# Patient Record
Sex: FEMALE | Race: WHITE | NOT HISPANIC OR LATINO | Employment: OTHER | URBAN - METROPOLITAN AREA
[De-identification: names, ages, dates, MRNs, and addresses within clinical notes are randomized per-mention and may not be internally consistent; named-entity substitution may affect disease eponyms.]

---

## 2017-01-13 RX ORDER — CLOBETASOL PROPIONATE 0.5 MG/G
OINTMENT TOPICAL
Qty: 45 G | Refills: 0 | Status: SHIPPED | OUTPATIENT
Start: 2017-01-13 | End: 2017-04-20 | Stop reason: SDUPTHER

## 2017-02-06 RX ORDER — ACYCLOVIR 400 MG/1
TABLET ORAL
Qty: 30 TABLET | Refills: 0 | Status: SHIPPED | OUTPATIENT
Start: 2017-02-06 | End: 2017-04-12 | Stop reason: SDUPTHER

## 2017-02-06 RX ORDER — ACYCLOVIR 400 MG/1
TABLET ORAL
Qty: 30 TABLET | Refills: 0 | Status: SHIPPED | OUTPATIENT
Start: 2017-02-06 | End: 2017-06-27 | Stop reason: SDUPTHER

## 2017-04-12 RX ORDER — ACYCLOVIR 400 MG/1
TABLET ORAL
Qty: 30 TABLET | Refills: 0 | Status: SHIPPED | OUTPATIENT
Start: 2017-04-12 | End: 2017-06-03 | Stop reason: SDUPTHER

## 2017-04-20 RX ORDER — CLOBETASOL PROPIONATE 0.5 MG/G
OINTMENT TOPICAL
Qty: 1 EACH | Refills: 1 | Status: SHIPPED | OUTPATIENT
Start: 2017-04-20 | End: 2017-06-27 | Stop reason: SDUPTHER

## 2017-06-05 RX ORDER — ACYCLOVIR 400 MG/1
TABLET ORAL
Qty: 30 TABLET | Refills: 0 | Status: SHIPPED | OUTPATIENT
Start: 2017-06-05 | End: 2017-07-31

## 2017-06-27 RX ORDER — ACYCLOVIR 400 MG/1
400 TABLET ORAL
Qty: 30 TABLET | Refills: 2 | Status: SHIPPED | OUTPATIENT
Start: 2017-06-27 | End: 2017-10-02 | Stop reason: SDUPTHER

## 2017-06-27 RX ORDER — CLOBETASOL PROPIONATE 0.5 MG/G
OINTMENT TOPICAL 2 TIMES WEEKLY
Qty: 1 EACH | Refills: 1 | Status: SHIPPED | OUTPATIENT
Start: 2017-06-29 | End: 2017-07-12 | Stop reason: SDUPTHER

## 2017-07-07 ENCOUNTER — TELEPHONE (OUTPATIENT)
Dept: OBSTETRICS AND GYNECOLOGY | Facility: CLINIC | Age: 72
End: 2017-07-07

## 2017-07-07 NOTE — TELEPHONE ENCOUNTER
Pt called in she stated the clobetasol (TEMOVATE) 0.05 % ointment is not enough. She stated that she needed a larger tube or rx or a different alternative.     Thank you-      Yeimi     Tell her a prescription has been called in

## 2017-07-10 ENCOUNTER — TELEPHONE (OUTPATIENT)
Dept: OBSTETRICS AND GYNECOLOGY | Facility: CLINIC | Age: 72
End: 2017-07-10

## 2017-07-12 RX ORDER — CLOBETASOL PROPIONATE 0.5 MG/G
OINTMENT TOPICAL 2 TIMES WEEKLY
Qty: 60 G | Refills: 1 | Status: SHIPPED | OUTPATIENT
Start: 2017-07-13 | End: 2017-11-07 | Stop reason: SDUPTHER

## 2017-07-24 RX ORDER — PSEUDOEPHEDRINE HCL AND TRIPROLIDINE HCL 60; 2.5 MG/1; MG/1
TABLET, FILM COATED ORAL
Refills: 2 | COMMUNITY
Start: 2017-05-29 | End: 2019-04-22 | Stop reason: SDUPTHER

## 2017-07-31 ENCOUNTER — APPOINTMENT (OUTPATIENT)
Dept: WOMENS IMAGING | Facility: HOSPITAL | Age: 72
End: 2017-07-31

## 2017-07-31 ENCOUNTER — PROCEDURE VISIT (OUTPATIENT)
Dept: OBSTETRICS AND GYNECOLOGY | Facility: CLINIC | Age: 72
End: 2017-07-31

## 2017-07-31 DIAGNOSIS — Z12.31 VISIT FOR SCREENING MAMMOGRAM: Primary | ICD-10-CM

## 2017-07-31 PROCEDURE — G0202 SCR MAMMO BI INCL CAD: HCPCS | Performed by: RADIOLOGY

## 2017-07-31 PROCEDURE — G0202 SCR MAMMO BI INCL CAD: HCPCS | Performed by: OBSTETRICS & GYNECOLOGY

## 2017-08-03 ENCOUNTER — TELEPHONE (OUTPATIENT)
Dept: OBSTETRICS AND GYNECOLOGY | Facility: CLINIC | Age: 72
End: 2017-08-03

## 2017-08-03 NOTE — TELEPHONE ENCOUNTER
----- Message from Arnaud Johnson MD sent at 8/3/2017  9:17 AM EDT -----  Tell the patient her mammogram was normal  ----- Message -----     From: Interface, Scans Incoming     Sent: 8/3/2017   8:15 AM       To: Arnaud Johnson MD

## 2017-10-02 RX ORDER — ACYCLOVIR 400 MG/1
TABLET ORAL
Qty: 30 TABLET | Refills: 0 | Status: SHIPPED | OUTPATIENT
Start: 2017-10-02 | End: 2017-10-17 | Stop reason: SDUPTHER

## 2017-10-18 RX ORDER — ACYCLOVIR 400 MG/1
TABLET ORAL
Qty: 30 TABLET | Refills: 0 | Status: SHIPPED | OUTPATIENT
Start: 2017-10-18 | End: 2017-11-07 | Stop reason: SDUPTHER

## 2017-11-08 RX ORDER — CLOBETASOL PROPIONATE 0.5 MG/G
OINTMENT TOPICAL
Qty: 1 EACH | Refills: 1 | Status: SHIPPED | OUTPATIENT
Start: 2017-11-08 | End: 2017-12-12 | Stop reason: SDUPTHER

## 2017-11-08 RX ORDER — ACYCLOVIR 400 MG/1
TABLET ORAL
Qty: 30 TABLET | Refills: 0 | Status: SHIPPED | OUTPATIENT
Start: 2017-11-08 | End: 2017-12-12 | Stop reason: SDUPTHER

## 2017-12-04 RX ORDER — ACYCLOVIR 400 MG/1
TABLET ORAL
Qty: 30 TABLET | Refills: 0 | OUTPATIENT
Start: 2017-12-04

## 2017-12-12 ENCOUNTER — OFFICE VISIT (OUTPATIENT)
Dept: OBSTETRICS AND GYNECOLOGY | Facility: CLINIC | Age: 72
End: 2017-12-12

## 2017-12-12 VITALS
HEIGHT: 69 IN | DIASTOLIC BLOOD PRESSURE: 68 MMHG | WEIGHT: 144 LBS | HEART RATE: 79 BPM | BODY MASS INDEX: 21.33 KG/M2 | SYSTOLIC BLOOD PRESSURE: 126 MMHG

## 2017-12-12 DIAGNOSIS — N90.4 LICHEN SCLEROSUS ET ATROPHICUS OF THE VULVA: ICD-10-CM

## 2017-12-12 DIAGNOSIS — Z01.419 ENCOUNTER FOR GYNECOLOGICAL EXAMINATION WITHOUT ABNORMAL FINDING: ICD-10-CM

## 2017-12-12 DIAGNOSIS — Z78.0 MENOPAUSE: ICD-10-CM

## 2017-12-12 DIAGNOSIS — Z01.419 PAP SMEAR, AS PART OF ROUTINE GYNECOLOGICAL EXAMINATION: Primary | ICD-10-CM

## 2017-12-12 DIAGNOSIS — Z12.11 COLON CANCER SCREENING: ICD-10-CM

## 2017-12-12 DIAGNOSIS — Z86.19 HISTORY OF HERPES GENITALIS: ICD-10-CM

## 2017-12-12 LAB — HEMOCCULT STL QL IA: NEGATIVE

## 2017-12-12 PROCEDURE — G0101 CA SCREEN;PELVIC/BREAST EXAM: HCPCS | Performed by: OBSTETRICS & GYNECOLOGY

## 2017-12-12 PROCEDURE — G0328 FECAL BLOOD SCRN IMMUNOASSAY: HCPCS | Performed by: OBSTETRICS & GYNECOLOGY

## 2017-12-12 RX ORDER — SERTRALINE HYDROCHLORIDE 100 MG/1
100 TABLET, FILM COATED ORAL
COMMUNITY
Start: 2017-10-11 | End: 2022-12-07 | Stop reason: HOSPADM

## 2017-12-12 RX ORDER — QUETIAPINE FUMARATE 200 MG/1
300 TABLET, FILM COATED ORAL
COMMUNITY
Start: 2017-10-11 | End: 2022-12-07 | Stop reason: HOSPADM

## 2017-12-12 RX ORDER — TEMAZEPAM 30 MG/1
30 CAPSULE ORAL
COMMUNITY
Start: 2017-10-23 | End: 2019-04-22 | Stop reason: SDUPTHER

## 2017-12-12 RX ORDER — CLOBETASOL PROPIONATE 0.5 MG/G
OINTMENT TOPICAL 2 TIMES WEEKLY
Qty: 45 G | Refills: 3 | Status: SHIPPED | OUTPATIENT
Start: 2017-12-14 | End: 2019-04-22 | Stop reason: SDUPTHER

## 2017-12-12 RX ORDER — ACYCLOVIR 400 MG/1
400 TABLET ORAL 2 TIMES DAILY
Qty: 60 TABLET | Refills: 12 | Status: SHIPPED | OUTPATIENT
Start: 2017-12-12 | End: 2018-12-26 | Stop reason: SDUPTHER

## 2017-12-12 NOTE — PROGRESS NOTES
Subjective   Basia Marquez is a 72 y.o. female  2, Para 2 AB 0, Living 2.  Last annual , last pap , last mammogram 2017, last colonoscopy 0.    History of Present Illness    The following portions of the patient's history were reviewed and updated as appropriate: allergies, current medications, past family history, past medical history, past social history, past surgical history and problem list.    Review of Systems   Respiratory:        Skin lesion on sternum   Genitourinary:        Vaginal atrophy  Occasional vulvar pruritus   All other systems reviewed and are negative.        Past Medical History:   Diagnosis Date   • Arthritis    • Herpes    • History of hysteroscopy     with endometrial bx   • Lichen sclerosus of female genitalia    • Osteopenia    • STD (female)      Menstrual History:  OB History      Para Term  AB Living    2 2 2   2    SAB TAB Ectopic Multiple Live Births                 Menarche age:14  No LMP recorded. Patient is postmenopausal.       Past Surgical History:   Procedure Laterality Date   • BREAST AUGMENTATION     • CYST REMOVAL     • HYSTEROSCOPY      with endometrial bx   • KNEE ARTHROPLASTY     • TONSILLECTOMY     • TUBAL ABDOMINAL LIGATION       OB History      Para Term  AB Living    2 2 2   2    SAB TAB Ectopic Multiple Live Births                Family History   Problem Relation Age of Onset   • Arthritis Mother    • Seizures Mother    • Arthritis Father    • Arthritis Sister    • Arthritis Brother    • Arthritis Maternal Grandmother    • Seizures Maternal Grandmother    • Arthritis Maternal Grandfather    • Arthritis Paternal Grandfather    • Colon cancer Paternal Grandfather    • Arthritis Brother      History   Smoking Status   • Never Smoker   Smokeless Tobacco   • Never Used     History   Alcohol Use   • Yes     Comment: social     Health Maintenance   Topic Date Due   • TDAP/TD VACCINES (1 - Tdap) 1966   • PNEUMOCOCCAL  "VACCINES (65+ LOW/MEDIUM RISK) (1 of 2 - PCV13) 02/22/2012   • HEPATITIS C SCREENING  10/03/2016   • MEDICARE ANNUAL WELLNESS  10/03/2016   • ZOSTER VACCINE  10/03/2016   • COLONOSCOPY  06/27/2017   • INFLUENZA VACCINE  08/01/2017   • DXA SCAN  12/12/2017   • MAMMOGRAM  07/31/2019       Current Outpatient Prescriptions:   •  QUEtiapine (SEROquel) 200 MG tablet, Take 300 mg by mouth., Disp: , Rfl:   •  sertraline (ZOLOFT) 100 MG tablet, Take 100 mg by mouth., Disp: , Rfl:   •  temazepam (RESTORIL) 30 MG capsule, Take 30 mg by mouth., Disp: , Rfl:   •  acyclovir (ZOVIRAX) 400 MG tablet, Take 1 tablet by mouth 2 (Two) Times a Day. Take no more than 5 doses a day., Disp: 60 tablet, Rfl: 12  •  calcium-vitamin D (OSCAL 500/200 D-3) 500-200 MG-UNIT per tablet, Take 1 tablet by mouth Daily., Disp: , Rfl:   •  [START ON 12/14/2017] clobetasol (TEMOVATE) 0.05 % ointment, Apply  topically 2 (Two) Times a Week. Apply sparingly, Disp: 45 g, Rfl: 3  •  levothyroxine (SYNTHROID, LEVOTHROID) 125 MCG tablet, Take 125 mcg by mouth., Disp: , Rfl:   •  Multiple Vitamin (MULTIVITAMIN) tablet, Take 1 tablet by mouth., Disp: , Rfl:   •  tolterodine (DETROL) 1 MG tablet, Take 1 mg by mouth., Disp: , Rfl:   •  WAL-ACT 2.5-60 MG tablet per tablet, TK 1 T PO Q 6 H PRF EAR OR NASAL ITCHING, Disp: , Rfl: 2  Sexual History: Not active  STD: Herpes            Objective   Vitals:    12/12/17 1135   BP: 126/68   Pulse: 79   Weight: 65.3 kg (144 lb)   Height: 175.3 cm (69\")     Physical Exam   Constitutional: She is oriented to person, place, and time. She appears well-developed and well-nourished.   HENT:   Head: Normocephalic.   Eyes: Pupils are equal, round, and reactive to light.   Neck: Normal range of motion. No thyromegaly present.   Cardiovascular: Normal rate, regular rhythm, normal heart sounds and intact distal pulses.    Pulmonary/Chest: Effort normal and breath sounds normal. No respiratory distress. She exhibits no tenderness. Right " breast exhibits no inverted nipple, no mass, no nipple discharge, no skin change and no tenderness. Left breast exhibits no inverted nipple, no mass, no nipple discharge, no skin change and no tenderness. Breasts are symmetrical.       Bilateral breast implants   Abdominal: Soft. Bowel sounds are normal. Hernia confirmed negative in the right inguinal area and confirmed negative in the left inguinal area.   Genitourinary: Rectum normal, vagina normal and uterus normal. Rectal exam shows no external hemorrhoid, no internal hemorrhoid, no fissure, no mass, no tenderness, anal tone normal and guaiac negative stool. No breast tenderness or discharge. Pelvic exam was performed with patient supine. There is no rash, tenderness, lesion or injury on the right labia. There is no rash, tenderness, lesion or injury on the left labia. Uterus is not enlarged and not tender. Cervix exhibits no motion tenderness, no discharge and no friability. Right adnexum displays no mass, no tenderness and no fullness. Left adnexum displays no mass, no tenderness and no fullness.   Genitourinary Comments: Vaginal atrophy   Lymphadenopathy:     She has no cervical adenopathy.        Right: No inguinal adenopathy present.        Left: No inguinal adenopathy present.   Neurological: She is alert and oriented to person, place, and time. She has normal reflexes.   Skin: Skin is warm and dry.   Psychiatric: She has a normal mood and affect. Her behavior is normal. Judgment and thought content normal.         Assessment/Plan   Virginia was seen today for gynecologic exam.    Diagnoses and all orders for this visit:    Pap smear, as part of routine gynecological examination  -     IGP, Apt HPV,rfx 16 / 18,45 - ThinPrep Vial, Cervix    Colon cancer screening  -     POC FECAL OCCULT BLOOD BY IMMUNOASSAY    Encounter for gynecological examination without abnormal finding    Menopause    Lichen sclerosus et atrophicus of the vulva  Comments:  Area looks  as if it is well treated with no gross evidence of disease    History of herpes genitalis  Comments:  She desires suppression    Other orders  -     clobetasol (TEMOVATE) 0.05 % ointment; Apply  topically 2 (Two) Times a Week. Apply sparingly  -     acyclovir (ZOVIRAX) 400 MG tablet; Take 1 tablet by mouth 2 (Two) Times a Day. Take no more than 5 doses a day.    Patient was counseled about herpes, lichen sclerosus, breast self-examination, need for colonoscopy(refused), frequency of Pap smears and mammograms

## 2017-12-14 LAB
CYTOLOGIST CVX/VAG CYTO: NORMAL
CYTOLOGY CVX/VAG DOC THIN PREP: NORMAL
DX ICD CODE: NORMAL
HIV 1 & 2 AB SER-IMP: NORMAL
HPV I/H RISK 4 DNA CVX QL PROBE+SIG AMP: NEGATIVE
OTHER STN SPEC: NORMAL
PATH REPORT.FINAL DX SPEC: NORMAL
STAT OF ADQ CVX/VAG CYTO-IMP: NORMAL

## 2018-03-06 RX ORDER — CLOBETASOL PROPIONATE 0.5 MG/G
OINTMENT TOPICAL
Refills: 1 | OUTPATIENT
Start: 2018-03-06

## 2018-03-06 NOTE — TELEPHONE ENCOUNTER
Patient had 3 refills ordered at the time of her prescription in December.  That should be enough to last her for the entire year

## 2018-03-07 RX ORDER — CLOBETASOL PROPIONATE 0.5 MG/G
OINTMENT TOPICAL
Qty: 60 G | Refills: 3 | Status: SHIPPED | OUTPATIENT
Start: 2018-03-07 | End: 2020-01-24 | Stop reason: SDUPTHER

## 2018-04-20 ENCOUNTER — TELEPHONE (OUTPATIENT)
Dept: OBSTETRICS AND GYNECOLOGY | Facility: CLINIC | Age: 73
End: 2018-04-20

## 2018-04-20 NOTE — TELEPHONE ENCOUNTER
Patient called back and spoke with Brooke.  Patient states that she did not come in for an annual exam but for a problem visit and a medication refill.  Patient is requesting we will out a form that Medicare gave her in order to change the coding on her exam (if you agree it should be done)?

## 2018-04-20 NOTE — TELEPHONE ENCOUNTER
Patient called complaining of receiving a bill from [employer] for her pap smear.  Patient states all she came in for was to get a medication refill and that she did not need an annual exam and a pap smear.  I explained to patient the appt she scheduled was for an annual exam and based on her office note with Dr. Johnson, that's how her visit was documented.  She states Medicare said her visit shouldn't have been coded as a routine visit?  I explained to patient it WAS a routine visit.

## 2018-04-23 NOTE — TELEPHONE ENCOUNTER
Patient had last been seen in 2015 for an annual exam and Pap smear.  She had been calling for refills on her medication for the past 3 years without being seen.  Patient was seen for an annual exam as well as follow-up on her problems date Pap smear was appropriate and should be charged.

## 2018-05-02 ENCOUNTER — TELEPHONE (OUTPATIENT)
Dept: OBSTETRICS AND GYNECOLOGY | Facility: CLINIC | Age: 73
End: 2018-05-02

## 2018-05-02 NOTE — TELEPHONE ENCOUNTER
Per pharmacy pt filled in April and is want a to  a new fill.  Per  one tube should last 1 year and she should not need to get another refill.  Pharmacy aware and will have the patient contact our office with any questions about this.  Jaclyn

## 2018-05-02 NOTE — TELEPHONE ENCOUNTER
----- Message from Jhony Krys sent at 5/1/2018  2:56 PM EDT -----  [facility] pharmacy called regarding this patients rx clobetasol (TEMOVATE) 0.05 % ointment saying that she needs a PA for this because her insurance is no longer paying for it. The pharmacist said they are getting replies from us but only stating she doesn't need a refill. They are not trying to get refills they are requesting a PA. Please advise.    Thank you!    Venkatesh ([facility])- [phone]

## 2018-05-10 ENCOUNTER — TELEPHONE (OUTPATIENT)
Dept: OBSTETRICS AND GYNECOLOGY | Facility: CLINIC | Age: 73
End: 2018-05-10

## 2018-05-10 NOTE — TELEPHONE ENCOUNTER
Pt called, stating that she went to pick her rx for Clobetasol, but her pharmacy is telling her it wont go through with insurance. Please advise.    Pt callback: [phone]

## 2018-05-10 NOTE — TELEPHONE ENCOUNTER
Tell the patient that when she needs a refill we will fill out a prior authorization.  This time she should have enough medication to last her for a year as she's been getting very frequent refills

## 2018-05-10 NOTE — TELEPHONE ENCOUNTER
Pt states she did not realize that 1 tube should last her that long.  She states she will cut back on the usage but that she is almost out and will probably need a refill by next week.  Please advise.  Jaclyn

## 2018-12-26 RX ORDER — ACYCLOVIR 400 MG/1
TABLET ORAL
Qty: 60 TABLET | Refills: 12 | Status: SHIPPED | OUTPATIENT
Start: 2018-12-26 | End: 2020-02-10 | Stop reason: SDUPTHER

## 2019-04-22 ENCOUNTER — OFFICE VISIT (OUTPATIENT)
Dept: OBSTETRICS AND GYNECOLOGY | Facility: CLINIC | Age: 74
End: 2019-04-22

## 2019-04-22 VITALS
HEIGHT: 68 IN | WEIGHT: 138 LBS | SYSTOLIC BLOOD PRESSURE: 138 MMHG | DIASTOLIC BLOOD PRESSURE: 80 MMHG | BODY MASS INDEX: 20.92 KG/M2

## 2019-04-22 DIAGNOSIS — N39.41 URGE INCONTINENCE: Primary | ICD-10-CM

## 2019-04-22 LAB
BILIRUB BLD-MCNC: NEGATIVE MG/DL
GLUCOSE UR STRIP-MCNC: NEGATIVE MG/DL
KETONES UR QL: ABNORMAL
LEUKOCYTE EST, POC: ABNORMAL
NITRITE UR-MCNC: NEGATIVE MG/ML
PH UR: 5.5 [PH] (ref 5–8)
PROT UR STRIP-MCNC: ABNORMAL MG/DL
RBC # UR STRIP: NEGATIVE /UL
SP GR UR: 1.02 (ref 1–1.03)
UROBILINOGEN UR QL: NORMAL

## 2019-04-22 PROCEDURE — 81003 URINALYSIS AUTO W/O SCOPE: CPT | Performed by: OBSTETRICS & GYNECOLOGY

## 2019-04-22 PROCEDURE — 99213 OFFICE O/P EST LOW 20 MIN: CPT | Performed by: OBSTETRICS & GYNECOLOGY

## 2019-04-22 RX ORDER — TEMAZEPAM 15 MG/1
CAPSULE ORAL
Refills: 2 | COMMUNITY
Start: 2019-04-09

## 2019-04-22 RX ORDER — BIMATOPROST 0.01 %
DROPS OPHTHALMIC (EYE)
Refills: 11 | COMMUNITY
Start: 2019-03-29

## 2019-04-22 RX ORDER — TOLTERODINE TARTRATE 1 MG/1
2 TABLET, EXTENDED RELEASE ORAL 2 TIMES DAILY
Qty: 120 TABLET | Refills: 3 | Status: SHIPPED | OUTPATIENT
Start: 2019-04-22 | End: 2019-08-09 | Stop reason: SDUPTHER

## 2019-04-22 RX ORDER — TOLTERODINE TARTRATE 1 MG/1
1 TABLET, EXTENDED RELEASE ORAL DAILY
COMMUNITY
End: 2019-04-22 | Stop reason: SDUPTHER

## 2019-04-22 RX ORDER — DICLOFENAC SODIUM 75 MG/1
TABLET, DELAYED RELEASE ORAL
Refills: 1 | COMMUNITY
Start: 2019-03-21 | End: 2023-02-22 | Stop reason: HOSPADM

## 2019-04-22 RX ORDER — PSEUDOEPHEDRINE HCL AND TRIPOLIDINE 60; 2.5 MG/1; MG/1
TABLET, FILM COATED ORAL
Status: ON HOLD | COMMUNITY
Start: 2019-04-01 | End: 2022-12-06

## 2019-04-22 RX ORDER — ALENDRONATE SODIUM 35 MG/1
TABLET ORAL
Refills: 6 | COMMUNITY
Start: 2019-03-25

## 2019-04-22 RX ORDER — MECLIZINE HYDROCHLORIDE 25 MG/1
TABLET ORAL
Refills: 6 | COMMUNITY
Start: 2019-03-21

## 2019-04-22 RX ORDER — LEVOTHYROXINE SODIUM 137 UG/1
TABLET ORAL
Refills: 0 | COMMUNITY
Start: 2019-04-05

## 2019-04-22 NOTE — PROGRESS NOTES
Subjective   Basia Marquez is a 74 y.o. female.     Cc:  Leakage of urine    History of Present Illness - 74 year old female who presents with recent exacerbation of urinary leakage.  Patient has been taking Detrol 1 mg for urge incontinence.  She states this worked for a period of time but her incontinence issue recently worsened.  She has uncontrolled leakage of urine but holds her urine fairly normally with coughing and sneezing.  She states that she loses control of her urine at nighttime and sometimes at rest.  She soils her clothes.  She requests further management.    The following portions of the patient's history were reviewed and updated as appropriate:   She  has a past medical history of Arthritis, Herpes, History of hysteroscopy, Lichen sclerosus of female genitalia, Osteopenia, and STD (female).  She  reports that she has never smoked. She has never used smokeless tobacco. She reports that she drinks alcohol. She reports that she does not use drugs.  Current Outpatient Medications   Medication Sig Dispense Refill   • acyclovir (ZOVIRAX) 400 MG tablet TAKE ONE TABLET BY MOUTH TWICE DAILY. TAKE NO MORE THAN 5 DOSES PER DAY 60 tablet 12   • alendronate (FOSAMAX) 35 MG tablet   6   • calcium-vitamin D (OSCAL 500/200 D-3) 500-200 MG-UNIT per tablet Take 1 tablet by mouth Daily.     • carbamide peroxide (DEBROX) 6.5 % otic solution Administer 5 drops into ear(s) as directed by provider.     • clobetasol (TEMOVATE) 0.05 % ointment APPLY TOPICALLY TO AFFECTED AREA TWICE A WEEK 60 g 3   • diclofenac (VOLTAREN) 75 MG EC tablet   1   • levothyroxine (SYNTHROID, LEVOTHROID) 137 MCG tablet   0   • LUMIGAN 0.01 % ophthalmic drops   11   • meclizine (ANTIVERT) 25 MG tablet   6   • Multiple Vitamin (MULTIVITAMIN) tablet Take 1 tablet by mouth.     • QUEtiapine (SEROquel) 200 MG tablet Take 300 mg by mouth.     • sertraline (ZOLOFT) 100 MG tablet Take 100 mg by mouth.     • temazepam (RESTORIL) 15 MG capsule   2    • tolterodine (DETROL) 1 MG tablet Take 2 tablets by mouth 2 (Two) Times a Day for 30 days. 120 tablet 3   • triprolidine-pseudoephedrine (WAL-ACT) 2.5-60 MG tablet per tablet TK 1 T PO Q 6 H PRF EAR OR NASAL ITCHING       No current facility-administered medications for this visit.      She is allergic to morphine..    Review of Systems   Constitutional: Negative for chills and fever.   Genitourinary: Positive for enuresis and urgency. Negative for dysuria and frequency.   Musculoskeletal: Negative for back pain.       Objective   Physical Exam   Constitutional: She appears well-developed and well-nourished.   Abdominal: Soft. There is no tenderness. There is no guarding.   Genitourinary: Vagina normal and uterus normal. Pelvic exam was performed with patient supine. There is no tenderness or lesion on the right labia. There is no tenderness or lesion on the left labia. Cervix exhibits no motion tenderness, no discharge and no friability. Right adnexum displays no mass and no tenderness. Left adnexum displays no mass and no tenderness.   Skin: Skin is warm and dry.   Psychiatric: She has a normal mood and affect. Her behavior is normal. Judgment and thought content normal.   Vitals reviewed.      Assessment/Plan   Virginia was seen today for urinary incontinence.    Diagnoses and all orders for this visit:    Urge incontinence  -     tolterodine (DETROL) 1 MG tablet; Take 2 tablets by mouth 2 (Two) Times a Day for 30 days.  -     Uncertain etiology for worsening symptoms.  Will check urine culture.  If positive, will give antibiotics.  Also, patient to double dose of Detrol and see if symptoms improve.  If these do not remedy issue, consideration for changing medication to Ditropan, Vesicare etc.  Otherwise, consideration for urology consultation.  Questions answered and patient voices understanding of plan.    Hector Dugan MD

## 2019-04-25 ENCOUNTER — TELEPHONE (OUTPATIENT)
Dept: OBSTETRICS AND GYNECOLOGY | Facility: CLINIC | Age: 74
End: 2019-04-25

## 2019-04-25 LAB
BACTERIA UR CULT: ABNORMAL
BACTERIA UR CULT: ABNORMAL
OTHER ANTIBIOTIC SUSC ISLT: ABNORMAL

## 2019-04-25 RX ORDER — NITROFURANTOIN 25; 75 MG/1; MG/1
100 CAPSULE ORAL 2 TIMES DAILY
Qty: 14 CAPSULE | Refills: 0 | Status: SHIPPED | OUTPATIENT
Start: 2019-04-25 | End: 2019-05-02 | Stop reason: SDUPTHER

## 2019-04-25 NOTE — TELEPHONE ENCOUNTER
Patient aware of results. 4-25-19/lw  ----- Message from Hector Dugan MD sent at 4/25/2019  2:51 PM EDT -----  LAW - Let her know that she has a UTI.  This could be a cause of her worsening leakage issue.  I called in antibiotics.

## 2019-05-06 RX ORDER — NITROFURANTOIN 25; 75 MG/1; MG/1
CAPSULE ORAL
Qty: 14 CAPSULE | Refills: 0 | Status: ON HOLD | OUTPATIENT
Start: 2019-05-06 | End: 2022-12-06

## 2019-05-17 ENCOUNTER — TELEPHONE (OUTPATIENT)
Dept: OBSTETRICS AND GYNECOLOGY | Facility: CLINIC | Age: 74
End: 2019-05-17

## 2019-05-17 NOTE — TELEPHONE ENCOUNTER
Patient called and stated she is having issues urinating on herself a lot. She is having a lot of accidents. Her insurance [employer] stated they will pay for adult diapers but a RX will need to be written. Patient is requesting the RX be sent to her pharmacy.       Thank you      Pharmacy: [address] - [address] [facility] [phone] [facility] [phone] FX

## 2019-05-23 RX ORDER — NITROFURANTOIN 25; 75 MG/1; MG/1
CAPSULE ORAL
Qty: 14 CAPSULE | Refills: 0 | Status: ON HOLD | OUTPATIENT
Start: 2019-05-23 | End: 2022-12-06

## 2019-05-30 NOTE — TELEPHONE ENCOUNTER
Patient called back and stated [facility] will not fill the RX for the adult diapers. She is needing advise where to get this RX filled. She is having a lot of bladder issues.       Thank you

## 2019-05-30 NOTE — TELEPHONE ENCOUNTER
Left message for  Patient to call back. 6-4-19/lw    Fax order to [facility] on [address] Left patient a message to call back. 5-31-19/lw       LAW    I will give you a hand written order.  Can you fax to [facility] and let patient know?    Kailee Dugan,      Spoke with [facility] pharmacy ([facility]) pharmacist says this needs to be prescribed through some place [place], for Insurance to cover.    Law

## 2019-08-09 DIAGNOSIS — N39.41 URGE INCONTINENCE: ICD-10-CM

## 2019-08-12 RX ORDER — TOLTERODINE TARTRATE 1 MG/1
TABLET, EXTENDED RELEASE ORAL
Qty: 120 TABLET | Refills: 1 | Status: SHIPPED | OUTPATIENT
Start: 2019-08-12 | End: 2019-10-10 | Stop reason: SDUPTHER

## 2019-10-10 DIAGNOSIS — N39.41 URGE INCONTINENCE: ICD-10-CM

## 2019-10-11 RX ORDER — TOLTERODINE TARTRATE 1 MG/1
TABLET, EXTENDED RELEASE ORAL
Qty: 120 TABLET | Refills: 1 | Status: SHIPPED | OUTPATIENT
Start: 2019-10-11 | End: 2019-12-13 | Stop reason: SDUPTHER

## 2019-12-13 DIAGNOSIS — N39.41 URGE INCONTINENCE: ICD-10-CM

## 2019-12-13 RX ORDER — TOLTERODINE TARTRATE 1 MG/1
TABLET, EXTENDED RELEASE ORAL
Qty: 120 TABLET | Refills: 0 | Status: SHIPPED | OUTPATIENT
Start: 2019-12-13 | End: 2022-05-20

## 2020-01-20 ENCOUNTER — TELEPHONE (OUTPATIENT)
Dept: OBSTETRICS AND GYNECOLOGY | Facility: CLINIC | Age: 75
End: 2020-01-20

## 2020-01-20 NOTE — TELEPHONE ENCOUNTER
Patient called and said her insurance is no longer covering clobetasol (TEMOVATE) 0.05 % ointment [4084] (Order [id number]) is needing a different medication called in.

## 2020-01-23 NOTE — TELEPHONE ENCOUNTER
Yue    Let her know that we are trying to work on a pre-authorization.  This may take a long time to get approved.  We spoke to the pharmacy.  In the meantime, she can either pay out of pocket for the medication or wait.  In addition, let her know that there is no guarantee that her insurance with authorize the medication.    Thanks    Kailee

## 2020-01-23 NOTE — TELEPHONE ENCOUNTER
Patient called back and she is aware she said that she can not afford to pay out of pocket she said she wouldn't mind trying something cheaper so she will call insurance and see what they will cover and call back.

## 2020-01-24 RX ORDER — CLOBETASOL PROPIONATE 0.5 MG/G
OINTMENT TOPICAL 2 TIMES DAILY
Qty: 60 G | Refills: 3 | Status: ON HOLD | OUTPATIENT
Start: 2020-01-24 | End: 2022-12-06

## 2020-01-24 NOTE — TELEPHONE ENCOUNTER
Pt called and said she has talked with her insurance company, they were declining the prescription because the provider had changed. She said they didn't know she had changed providers but that everything has been taken care of and that they will cover the medication. Should be called into her pharmacy.

## 2020-02-10 ENCOUNTER — OFFICE VISIT (OUTPATIENT)
Dept: OBSTETRICS AND GYNECOLOGY | Facility: CLINIC | Age: 75
End: 2020-02-10

## 2020-02-10 VITALS
DIASTOLIC BLOOD PRESSURE: 80 MMHG | WEIGHT: 143 LBS | SYSTOLIC BLOOD PRESSURE: 118 MMHG | HEIGHT: 69 IN | BODY MASS INDEX: 21.18 KG/M2

## 2020-02-10 DIAGNOSIS — R32 INCONTINENCE OF URINE IN FEMALE: ICD-10-CM

## 2020-02-10 DIAGNOSIS — Z12.11 COLON CANCER SCREENING: Primary | ICD-10-CM

## 2020-02-10 DIAGNOSIS — Z12.31 ENCOUNTER FOR SCREENING MAMMOGRAM FOR MALIGNANT NEOPLASM OF BREAST: ICD-10-CM

## 2020-02-10 DIAGNOSIS — Z86.19 HISTORY OF HERPES GENITALIS: ICD-10-CM

## 2020-02-10 LAB
DEVELOPER EXPIRATION DATE: NORMAL
DEVELOPER LOT NUMBER: NORMAL
EXPIRATION DATE: NORMAL
FECAL OCCULT BLOOD SCREEN, POC: NEGATIVE
Lab: NORMAL
NEGATIVE CONTROL: NEGATIVE
POSITIVE CONTROL: POSITIVE

## 2020-02-10 PROCEDURE — G0328 FECAL BLOOD SCRN IMMUNOASSAY: HCPCS | Performed by: OBSTETRICS & GYNECOLOGY

## 2020-02-10 PROCEDURE — 99214 OFFICE O/P EST MOD 30 MIN: CPT | Performed by: OBSTETRICS & GYNECOLOGY

## 2020-02-10 PROCEDURE — 81003 URINALYSIS AUTO W/O SCOPE: CPT | Performed by: OBSTETRICS & GYNECOLOGY

## 2020-02-10 RX ORDER — QUETIAPINE FUMARATE 200 MG/1
400 TABLET, FILM COATED ORAL DAILY
COMMUNITY
Start: 2020-01-09

## 2020-02-10 RX ORDER — TEMAZEPAM 15 MG/1
30 CAPSULE ORAL
COMMUNITY
Start: 2020-01-09 | End: 2022-12-07 | Stop reason: HOSPADM

## 2020-02-10 RX ORDER — ACYCLOVIR 400 MG/1
200 TABLET ORAL 2 TIMES DAILY
Qty: 30 TABLET | Refills: 11 | Status: SHIPPED | OUTPATIENT
Start: 2020-02-10 | End: 2021-01-25

## 2020-02-10 RX ORDER — SERTRALINE HYDROCHLORIDE 100 MG/1
100 TABLET, FILM COATED ORAL DAILY
COMMUNITY
Start: 2020-01-09 | End: 2022-12-07 | Stop reason: HOSPADM

## 2020-02-10 NOTE — PROGRESS NOTES
"Subjective   Basia Marquez is a 74 y.o. female.     Cc:  Refills on medication, urinary leakage and requests mammogram    History of Present Illness - Patient is a 74 year old female who presents for several issues.  1)  Patient with leakage of urine.  She is wearing an \"adult diaper\" at night.  She tried anti-cholinergic medications in past with some success, but feels these are not as effective.  She has not seen a urogynecologist.  2)  Refills on medication.  Patient was exposed to HSV 2 decades ago and requests refills on Acyclovir.  In addition, patient requests refills on Temovate.  These control her vulvitis and her HSV.  3)  Screening colonoscopy.  Patient has never had a colonoscopy and her brother had colonoscopy.  She is interested in \"alternatives.\"  4)  Patient reports needing a mammogram.    The following portions of the patient's history were reviewed and updated as appropriate:   She  has a past medical history of Arthritis, Herpes, History of hysteroscopy, Lichen sclerosus of female genitalia, Osteopenia, and STD (female).  She  reports that she has never smoked. She has never used smokeless tobacco. She reports that she drinks alcohol. She reports that she does not use drugs.  Current Outpatient Medications   Medication Sig Dispense Refill   • QUEtiapine (SEROquel) 200 MG tablet Take 400 mg by mouth Daily.     • sertraline (ZOLOFT) 100 MG tablet Take 100 mg by mouth Daily.     • temazepam (RESTORIL) 15 MG capsule Take 30 mg by mouth.     • acyclovir (ZOVIRAX) 400 MG tablet Take 0.5 tablets by mouth 2 (Two) Times a Day for 30 days. Take no more than 5 doses a day. 30 tablet 11   • alendronate (FOSAMAX) 35 MG tablet   6   • calcium-vitamin D (OSCAL 500/200 D-3) 500-200 MG-UNIT per tablet Take 1 tablet by mouth Daily.     • carbamide peroxide (DEBROX) 6.5 % otic solution Administer 5 drops into ear(s) as directed by provider.     • clobetasol (TEMOVATE) 0.05 % ointment Apply  topically to the " appropriate area as directed 2 (Two) Times a Day. 60 g 3   • diclofenac (VOLTAREN) 75 MG EC tablet   1   • levothyroxine (SYNTHROID, LEVOTHROID) 137 MCG tablet   0   • LUMIGAN 0.01 % ophthalmic drops   11   • meclizine (ANTIVERT) 25 MG tablet   6   • Multiple Vitamin (MULTIVITAMIN) tablet Take 1 tablet by mouth.     • nitrofurantoin, macrocrystal-monohydrate, (MACROBID) 100 MG capsule TAKE 1 CAPSULE BY MOUTH TWICE DAILY FOR 7 DAYS 14 capsule 0   • nitrofurantoin, macrocrystal-monohydrate, (MACROBID) 100 MG capsule TAKE 1 CAPSULE BY MOUTH TWICE DAILY FOR 7 DAYS 14 capsule 0   • QUEtiapine (SEROquel) 200 MG tablet Take 300 mg by mouth.     • sertraline (ZOLOFT) 100 MG tablet Take 100 mg by mouth.     • temazepam (RESTORIL) 15 MG capsule   2   • tolterodine (DETROL) 1 MG tablet TAKE 2 TABLETS BY MOUTH TWICE DAILY 120 tablet 0   • triprolidine-pseudoephedrine (WAL-ACT) 2.5-60 MG tablet per tablet TK 1 T PO Q 6 H PRF EAR OR NASAL ITCHING       No current facility-administered medications for this visit.      She is allergic to morphine..    Review of Systems   Constitutional: Negative for chills and fever.   Gastrointestinal: Negative for constipation, diarrhea, nausea and vomiting.   Genitourinary: Positive for difficulty urinating. Negative for dysuria and vaginal pain.   Psychiatric/Behavioral: Negative for behavioral problems and confusion.       Objective   Physical Exam   Constitutional: She appears well-developed and well-nourished.   HENT:   Head: Normocephalic and atraumatic.   Eyes: Conjunctivae are normal.   Neck: Normal range of motion. Neck supple. No thyromegaly present.   Cardiovascular: Normal rate and regular rhythm.   Pulmonary/Chest: Effort normal. She has no wheezes. She has no rales.   Abdominal: Soft. There is no tenderness. There is no rebound and no guarding.   Genitourinary: Rectum normal, vagina normal and uterus normal. Rectal exam shows no tenderness and guaiac negative stool. Pelvic exam was  performed with patient supine. There is no lesion on the right labia. There is no lesion on the left labia. Uterus is not tender. Cervix exhibits no motion tenderness and no friability. Right adnexum displays no tenderness and no fullness. Left adnexum displays no tenderness and no fullness. No vaginal discharge found.   Musculoskeletal: Normal range of motion. She exhibits no edema.   Neurological: She is alert. Coordination normal.   Skin: Skin is warm and dry.   Psychiatric: She has a normal mood and affect. Her behavior is normal. Judgment and thought content normal.       Assessment/Plan   Virginia was seen today for gynecologic exam.    Diagnoses and all orders for this visit:    Colon cancer screening  -     POC Occult Blood Stool  -     Cologuard - Stool, Per Rectum; Future  -     Discussed importance of proceeding with some form of colon testing for cancer.    Incontinence of urine in female  -     Urine Culture - Urine, Urine, Clean Catch  -     Ambulatory Referral to Gynecologic Urology  -     Patient without straightforward symptoms of urge or stress.  Will check urine culture and refer to urogynecology.    History of herpes genitalis  -     acyclovir (ZOVIRAX) 400 MG tablet; Take 0.5 tablets by mouth 2 (Two) Times a Day for 30 days. Take no more than 5 doses a day.  -     Refilled medications.    Encounter for screening mammogram for malignant neoplasm of breast   -     Mammo screening digital tomosynthesis bilateral w CAD; Future  -     Patient with implants and wants screening.

## 2020-02-11 ENCOUNTER — TELEPHONE (OUTPATIENT)
Dept: OBSTETRICS AND GYNECOLOGY | Facility: CLINIC | Age: 75
End: 2020-02-11

## 2020-02-11 LAB
BILIRUB BLD-MCNC: NEGATIVE MG/DL
GLUCOSE UR STRIP-MCNC: NEGATIVE MG/DL
KETONES UR QL: NEGATIVE
LEUKOCYTE EST, POC: ABNORMAL
NITRITE UR-MCNC: NEGATIVE MG/ML
PH UR: 6 [PH] (ref 5–8)
PROT UR STRIP-MCNC: NEGATIVE MG/DL
RBC # UR STRIP: ABNORMAL /UL
SP GR UR: 1.03 (ref 1–1.03)
UROBILINOGEN UR QL: NORMAL

## 2020-02-11 NOTE — TELEPHONE ENCOUNTER
Pt is aware she is scheduled at [facility] with Dr. Zendejas on 3/13/2020 at 9:00am, 8:45 arrival.  She will receive a NP packet in the mail, she MUST complete the packet and take to appt along with insurance, photo id and copay if applicable.  Their address is [address] [place] [address] [id number].  If she would like to r/s she may call them at [phone].    Pt # [phone]

## 2020-02-12 LAB
BACTERIA UR CULT: NORMAL
BACTERIA UR CULT: NORMAL

## 2020-02-13 ENCOUNTER — TELEPHONE (OUTPATIENT)
Dept: OBSTETRICS AND GYNECOLOGY | Facility: CLINIC | Age: 75
End: 2020-02-13

## 2020-02-13 NOTE — TELEPHONE ENCOUNTER
Left message for patient to call back. 2-/lw  ----- Message from Hector Dugan MD sent at 2/13/2020  6:17 AM EST -----  LAW - Let her know that her urine culture was negative.

## 2020-02-15 ENCOUNTER — RESULTS ENCOUNTER (OUTPATIENT)
Dept: OBSTETRICS AND GYNECOLOGY | Facility: CLINIC | Age: 75
End: 2020-02-15

## 2020-02-15 DIAGNOSIS — Z12.11 COLON CANCER SCREENING: ICD-10-CM

## 2020-02-20 ENCOUNTER — TELEPHONE (OUTPATIENT)
Dept: OBSTETRICS AND GYNECOLOGY | Facility: CLINIC | Age: 75
End: 2020-02-20

## 2020-02-20 NOTE — TELEPHONE ENCOUNTER
Pt called and would like you for you to call in Priamcinolone. Stated her insurance does not cover clobetasol (TEMOVATE) 0.05 % ointment [[id number]] and her pharmacist advised her this other medication was similar and to ask for this.

## 2020-02-25 RX ORDER — TRIAMCINOLONE ACETONIDE 5 MG/G
OINTMENT TOPICAL 3 TIMES DAILY
Qty: 15 G | Refills: 3 | Status: SHIPPED | OUTPATIENT
Start: 2020-02-25 | End: 2021-03-31

## 2020-02-25 NOTE — TELEPHONE ENCOUNTER
Yue    Let her know that it was called in.  It may not be as potent as her other ointment, but she can try it and see how it works.    Thanks    Kailee

## 2021-01-25 DIAGNOSIS — Z86.19 HISTORY OF HERPES GENITALIS: ICD-10-CM

## 2021-01-25 RX ORDER — ACYCLOVIR 400 MG/1
TABLET ORAL
Qty: 30 TABLET | Refills: 0 | Status: SHIPPED | OUTPATIENT
Start: 2021-01-25 | End: 2021-03-03 | Stop reason: SDUPTHER

## 2021-03-03 ENCOUNTER — TELEPHONE (OUTPATIENT)
Dept: OBSTETRICS AND GYNECOLOGY | Facility: CLINIC | Age: 76
End: 2021-03-03

## 2021-03-03 DIAGNOSIS — Z86.19 HISTORY OF HERPES GENITALIS: ICD-10-CM

## 2021-03-03 RX ORDER — ACYCLOVIR 400 MG/1
400 TABLET ORAL 2 TIMES DAILY
Qty: 30 TABLET | Refills: 0 | Status: SHIPPED | OUTPATIENT
Start: 2021-03-03 | End: 2021-03-17

## 2021-03-03 NOTE — TELEPHONE ENCOUNTER
Patient called requesting another refill for the acyclovir to be sent to her [facility] pharmacy.

## 2021-03-16 DIAGNOSIS — Z86.19 HISTORY OF HERPES GENITALIS: ICD-10-CM

## 2021-03-17 RX ORDER — ACYCLOVIR 400 MG/1
TABLET ORAL
Qty: 30 TABLET | Refills: 0 | Status: SHIPPED | OUTPATIENT
Start: 2021-03-17 | End: 2021-04-08

## 2021-03-31 RX ORDER — TRIAMCINOLONE ACETONIDE 5 MG/G
OINTMENT TOPICAL
Qty: 15 G | Refills: 0 | Status: ON HOLD | OUTPATIENT
Start: 2021-03-31 | End: 2022-12-06

## 2021-04-07 DIAGNOSIS — Z86.19 HISTORY OF HERPES GENITALIS: ICD-10-CM

## 2021-04-08 RX ORDER — ACYCLOVIR 400 MG/1
TABLET ORAL
Qty: 30 TABLET | Refills: 0 | Status: SHIPPED | OUTPATIENT
Start: 2021-04-08 | End: 2021-05-10

## 2021-05-09 DIAGNOSIS — Z86.19 HISTORY OF HERPES GENITALIS: ICD-10-CM

## 2021-05-10 RX ORDER — ACYCLOVIR 400 MG/1
TABLET ORAL
Qty: 30 TABLET | Refills: 0 | Status: SHIPPED | OUTPATIENT
Start: 2021-05-10 | End: 2021-06-03

## 2021-06-02 DIAGNOSIS — Z86.19 HISTORY OF HERPES GENITALIS: ICD-10-CM

## 2021-06-03 ENCOUNTER — TELEPHONE (OUTPATIENT)
Dept: OBSTETRICS AND GYNECOLOGY | Facility: CLINIC | Age: 76
End: 2021-06-03

## 2021-06-03 RX ORDER — ACYCLOVIR 400 MG/1
TABLET ORAL
Qty: 30 TABLET | Refills: 2 | Status: SHIPPED | OUTPATIENT
Start: 2021-06-03 | End: 2021-09-07

## 2021-06-03 NOTE — TELEPHONE ENCOUNTER
Jaclyn    Please call patient and let her know she is past due for her mammogram.  She does not need an appointment for an exam...just mammogram.    I refilled her medications.    Please schedule.    Thanks    Kailee

## 2021-06-10 ENCOUNTER — PROCEDURE VISIT (OUTPATIENT)
Dept: OBSTETRICS AND GYNECOLOGY | Facility: CLINIC | Age: 76
End: 2021-06-10

## 2021-06-10 ENCOUNTER — APPOINTMENT (OUTPATIENT)
Dept: WOMENS IMAGING | Facility: HOSPITAL | Age: 76
End: 2021-06-10

## 2021-06-10 DIAGNOSIS — Z12.31 VISIT FOR SCREENING MAMMOGRAM: Primary | ICD-10-CM

## 2021-06-10 PROCEDURE — 77067 SCR MAMMO BI INCL CAD: CPT | Performed by: OBSTETRICS & GYNECOLOGY

## 2021-06-10 PROCEDURE — 77063 BREAST TOMOSYNTHESIS BI: CPT | Performed by: RADIOLOGY

## 2021-06-10 PROCEDURE — 77067 SCR MAMMO BI INCL CAD: CPT | Performed by: RADIOLOGY

## 2021-06-10 PROCEDURE — 77063 BREAST TOMOSYNTHESIS BI: CPT | Performed by: OBSTETRICS & GYNECOLOGY

## 2021-09-03 DIAGNOSIS — Z86.19 HISTORY OF HERPES GENITALIS: ICD-10-CM

## 2021-09-07 RX ORDER — ACYCLOVIR 400 MG/1
TABLET ORAL
Qty: 30 TABLET | Refills: 0 | Status: SHIPPED | OUTPATIENT
Start: 2021-09-07 | End: 2021-10-11

## 2021-10-09 DIAGNOSIS — Z86.19 HISTORY OF HERPES GENITALIS: ICD-10-CM

## 2021-10-11 RX ORDER — ACYCLOVIR 400 MG/1
TABLET ORAL
Qty: 30 TABLET | Refills: 0 | Status: SHIPPED | OUTPATIENT
Start: 2021-10-11 | End: 2021-11-08

## 2021-11-06 DIAGNOSIS — Z86.19 HISTORY OF HERPES GENITALIS: ICD-10-CM

## 2021-11-08 RX ORDER — ACYCLOVIR 400 MG/1
TABLET ORAL
Qty: 30 TABLET | Refills: 4 | Status: SHIPPED | OUTPATIENT
Start: 2021-11-08 | End: 2022-04-01

## 2021-12-13 ENCOUNTER — HOSPITAL ENCOUNTER (OUTPATIENT)
Dept: GENERAL RADIOLOGY | Facility: HOSPITAL | Age: 76
Discharge: HOME | End: 2021-12-13
Admitting: INTERNAL MEDICINE

## 2021-12-13 ENCOUNTER — TRANSCRIBE ORDERS (OUTPATIENT)
Dept: ADMINISTRATIVE | Facility: HOSPITAL | Age: 76
End: 2021-12-13

## 2021-12-13 DIAGNOSIS — M20.001 ACQUIRED DEFORMITY OF JOINT OF FINGER OF RIGHT HAND: Primary | ICD-10-CM

## 2021-12-13 DIAGNOSIS — M20.001 ACQUIRED DEFORMITY OF JOINT OF FINGER OF RIGHT HAND: ICD-10-CM

## 2021-12-13 PROCEDURE — 73130 X-RAY EXAM OF HAND: CPT

## 2022-03-29 ENCOUNTER — LAB (OUTPATIENT)
Dept: LAB | Facility: HOSPITAL | Age: 77
End: 2022-03-29

## 2022-03-29 ENCOUNTER — TRANSCRIBE ORDERS (OUTPATIENT)
Dept: ADMINISTRATIVE | Facility: HOSPITAL | Age: 77
End: 2022-03-29

## 2022-03-29 DIAGNOSIS — Z00.01 ENCOUNTER FOR GENERAL ADULT MEDICAL EXAMINATION WITH ABNORMAL FINDINGS: Primary | ICD-10-CM

## 2022-03-29 DIAGNOSIS — E78.5 DYSLIPIDEMIA: ICD-10-CM

## 2022-03-29 DIAGNOSIS — Z00.01 ENCOUNTER FOR GENERAL ADULT MEDICAL EXAMINATION WITH ABNORMAL FINDINGS: ICD-10-CM

## 2022-03-29 DIAGNOSIS — E03.9 HYPOTHYROIDISM, UNSPECIFIED TYPE: ICD-10-CM

## 2022-03-29 LAB
ALBUMIN SERPL-MCNC: 4.8 G/DL (ref 3.5–5.2)
ALBUMIN/GLOB SERPL: 2.2 G/DL
ALP SERPL-CCNC: 63 U/L (ref 39–117)
ALT SERPL W P-5'-P-CCNC: 16 U/L (ref 1–33)
ANION GAP SERPL CALCULATED.3IONS-SCNC: 9 MMOL/L (ref 5–15)
AST SERPL-CCNC: 14 U/L (ref 1–32)
BACTERIA UR QL AUTO: ABNORMAL /HPF
BILIRUB SERPL-MCNC: 0.3 MG/DL (ref 0–1.2)
BILIRUB UR QL STRIP: NEGATIVE
BUN SERPL-MCNC: 22 MG/DL (ref 8–23)
BUN/CREAT SERPL: 32.4 (ref 7–25)
CALCIUM SPEC-SCNC: 9.5 MG/DL (ref 8.6–10.5)
CHLORIDE SERPL-SCNC: 108 MMOL/L (ref 98–107)
CHOLEST SERPL-MCNC: 224 MG/DL (ref 0–200)
CLARITY UR: ABNORMAL
CO2 SERPL-SCNC: 25 MMOL/L (ref 22–29)
COD CRY URNS QL: ABNORMAL /HPF
COLOR UR: YELLOW
CREAT SERPL-MCNC: 0.68 MG/DL (ref 0.57–1)
DEPRECATED RDW RBC AUTO: 47.6 FL (ref 37–54)
EGFRCR SERPLBLD CKD-EPI 2021: 91 ML/MIN/1.73
ERYTHROCYTE [DISTWIDTH] IN BLOOD BY AUTOMATED COUNT: 13.3 % (ref 12.3–15.4)
FOLATE SERPL-MCNC: >20 NG/ML (ref 4.78–24.2)
GLOBULIN UR ELPH-MCNC: 2.2 GM/DL
GLUCOSE SERPL-MCNC: 93 MG/DL (ref 65–99)
GLUCOSE UR STRIP-MCNC: NEGATIVE MG/DL
HCT VFR BLD AUTO: 44 % (ref 34–46.6)
HDLC SERPL-MCNC: 76 MG/DL (ref 40–60)
HGB BLD-MCNC: 14.3 G/DL (ref 12–15.9)
HGB UR QL STRIP.AUTO: ABNORMAL
HYALINE CASTS UR QL AUTO: ABNORMAL /LPF
KETONES UR QL STRIP: NEGATIVE
LDLC SERPL CALC-MCNC: 135 MG/DL (ref 0–100)
LDLC/HDLC SERPL: 1.75 {RATIO}
LEUKOCYTE ESTERASE UR QL STRIP.AUTO: ABNORMAL
MCH RBC QN AUTO: 31.4 PG (ref 26.6–33)
MCHC RBC AUTO-ENTMCNC: 32.5 G/DL (ref 31.5–35.7)
MCV RBC AUTO: 96.7 FL (ref 79–97)
NITRITE UR QL STRIP: NEGATIVE
PH UR STRIP.AUTO: 5.5 [PH] (ref 5–8)
PLATELET # BLD AUTO: 306 10*3/MM3 (ref 140–450)
PMV BLD AUTO: 9.6 FL (ref 6–12)
POTASSIUM SERPL-SCNC: 3.6 MMOL/L (ref 3.5–5.2)
PROT SERPL-MCNC: 7 G/DL (ref 6–8.5)
PROT UR QL STRIP: ABNORMAL
RBC # BLD AUTO: 4.55 10*6/MM3 (ref 3.77–5.28)
RBC # UR STRIP: ABNORMAL /HPF
REF LAB TEST METHOD: ABNORMAL
SODIUM SERPL-SCNC: 142 MMOL/L (ref 136–145)
SP GR UR STRIP: 1.02 (ref 1–1.03)
SQUAMOUS #/AREA URNS HPF: ABNORMAL /HPF
T3FREE SERPL-MCNC: 2.22 PG/ML (ref 2–4.4)
TRIGL SERPL-MCNC: 76 MG/DL (ref 0–150)
TSH SERPL DL<=0.05 MIU/L-ACNC: 4.6 UIU/ML (ref 0.27–4.2)
UROBILINOGEN UR QL STRIP: ABNORMAL
VLDLC SERPL-MCNC: 13 MG/DL (ref 5–40)
WBC # UR STRIP: ABNORMAL /HPF
WBC NRBC COR # BLD: 4.75 10*3/MM3 (ref 3.4–10.8)

## 2022-03-29 PROCEDURE — 84443 ASSAY THYROID STIM HORMONE: CPT

## 2022-03-29 PROCEDURE — 80061 LIPID PANEL: CPT

## 2022-03-29 PROCEDURE — 84481 FREE ASSAY (FT-3): CPT

## 2022-03-29 PROCEDURE — 85027 COMPLETE CBC AUTOMATED: CPT

## 2022-03-29 PROCEDURE — 82746 ASSAY OF FOLIC ACID SERUM: CPT

## 2022-03-29 PROCEDURE — 36415 COLL VENOUS BLD VENIPUNCTURE: CPT

## 2022-03-29 PROCEDURE — 81001 URINALYSIS AUTO W/SCOPE: CPT

## 2022-03-29 PROCEDURE — 80053 COMPREHEN METABOLIC PANEL: CPT

## 2022-03-31 DIAGNOSIS — Z86.19 HISTORY OF HERPES GENITALIS: ICD-10-CM

## 2022-04-01 RX ORDER — ACYCLOVIR 400 MG/1
TABLET ORAL
Qty: 30 TABLET | Refills: 0 | Status: SHIPPED | OUTPATIENT
Start: 2022-04-01 | End: 2022-04-11

## 2022-04-01 NOTE — TELEPHONE ENCOUNTER
Left message for Virginia to call the office to make AE with Dr Dugan. Her last AE was 02/10/2020 and her last Mx 06/16/2021.

## 2022-04-01 NOTE — TELEPHONE ENCOUNTER
Patient called back and tried scheduling AE and Mx, but after a few minutes patient stated that she has to hang up and call office back to schedule.

## 2022-04-11 DIAGNOSIS — Z86.19 HISTORY OF HERPES GENITALIS: ICD-10-CM

## 2022-04-11 RX ORDER — ACYCLOVIR 400 MG/1
TABLET ORAL
Qty: 30 TABLET | Refills: 0 | Status: SHIPPED | OUTPATIENT
Start: 2022-04-11 | End: 2022-05-20 | Stop reason: SDUPTHER

## 2022-04-25 ENCOUNTER — TELEPHONE (OUTPATIENT)
Dept: OBSTETRICS AND GYNECOLOGY | Facility: CLINIC | Age: 77
End: 2022-04-25

## 2022-04-26 NOTE — TELEPHONE ENCOUNTER
Sandi    You can move her to later in the day.    It has been over 2 years since her last examination; therefore, I recommend a basic exam.    Thanks    Kailee

## 2022-04-26 NOTE — TELEPHONE ENCOUNTER
Pt is wanting to be seen past 3 next Wednesday at [facility] can I take your 3:15 or 3:30 that day? She also wants to know if this is going to be an exam on Wednesday.  Please advise  Thanks jeff

## 2022-04-26 NOTE — TELEPHONE ENCOUNTER
Fortino    She can see me next week at [facility] at 1 pm if she is ok with that date and time.    Thanks    Kailee

## 2022-05-20 ENCOUNTER — OFFICE VISIT (OUTPATIENT)
Dept: OBSTETRICS AND GYNECOLOGY | Facility: CLINIC | Age: 77
End: 2022-05-20

## 2022-05-20 VITALS
HEIGHT: 69 IN | WEIGHT: 131 LBS | SYSTOLIC BLOOD PRESSURE: 109 MMHG | DIASTOLIC BLOOD PRESSURE: 75 MMHG | BODY MASS INDEX: 19.4 KG/M2

## 2022-05-20 DIAGNOSIS — N39.41 URGE INCONTINENCE: ICD-10-CM

## 2022-05-20 DIAGNOSIS — M85.80 OSTEOPENIA AFTER MENOPAUSE: ICD-10-CM

## 2022-05-20 DIAGNOSIS — Z78.0 OSTEOPENIA AFTER MENOPAUSE: ICD-10-CM

## 2022-05-20 DIAGNOSIS — Z86.19 HISTORY OF HERPES GENITALIS: ICD-10-CM

## 2022-05-20 DIAGNOSIS — Z01.419 VISIT FOR GYNECOLOGIC EXAMINATION: Primary | ICD-10-CM

## 2022-05-20 PROCEDURE — G0101 CA SCREEN;PELVIC/BREAST EXAM: HCPCS | Performed by: OBSTETRICS & GYNECOLOGY

## 2022-05-20 RX ORDER — ACYCLOVIR 400 MG/1
400 TABLET ORAL DAILY
Qty: 90 TABLET | Refills: 3 | Status: SHIPPED | OUTPATIENT
Start: 2022-05-20 | End: 2022-11-10 | Stop reason: SDUPTHER

## 2022-05-20 RX ORDER — SOLIFENACIN SUCCINATE 10 MG/1
10 TABLET, FILM COATED ORAL DAILY
Qty: 90 TABLET | Refills: 3 | Status: SHIPPED | OUTPATIENT
Start: 2022-05-20 | End: 2022-11-10 | Stop reason: SDUPTHER

## 2022-05-20 NOTE — PROGRESS NOTES
[facility]  [address]  [place], [place] [address]  Phone: [phone] / Fax:  [phone]      2022    [address] [place], [place] [id number]    Talisha Goldstein MD    Chief Complaint   Patient presents with   • Gynecologic Exam     AE and last pap  normal,MX ,dexa        Basia Marquez is here for annual gynecologic exam.  HPI - Patient with normal mammogram in 2021.  Her last DEXA was in 2017 and she had osteopenia.  She is currently take a weekly bisphosphonate.  She refuses colon cancer screening.    Past Medical History:   Diagnosis Date   • Arthritis    • Herpes    • History of hysteroscopy     with endometrial bx   • Lichen sclerosus of female genitalia    • Osteopenia    • STD (female)        Past Surgical History:   Procedure Laterality Date   • BREAST AUGMENTATION     • CYST REMOVAL     • HYSTEROSCOPY      with endometrial bx   • KNEE ARTHROPLASTY     • TONSILLECTOMY     • TUBAL ABDOMINAL LIGATION         Allergies   Allergen Reactions   • Morphine Hives and Itching     HIVES IN MOUTH       Social History     Socioeconomic History   • Marital status: Single   Tobacco Use   • Smoking status: Never Smoker   • Smokeless tobacco: Never Used   Substance and Sexual Activity   • Alcohol use: Yes     Comment: social   • Drug use: No   • Sexual activity: Not Currently       Family History   Problem Relation Age of Onset   • Arthritis Mother    • Seizures Mother    • Arthritis Father    • Arthritis Sister    • Arthritis Brother    • Arthritis Maternal Grandmother    • Seizures Maternal Grandmother    • Arthritis Maternal Grandfather    • Arthritis Paternal Grandfather    • Colon cancer Paternal Grandfather    • Arthritis Brother        No LMP recorded. Patient is postmenopausal.    OB History        2    Para   2    Term   2            AB        Living   2       SAB        IAB        Ectopic        Molar        Multiple        Live Births      "               Vitals:    05/20/22 1506   BP: 109/75   Weight: 59.4 kg (131 lb)   Height: 175.3 cm (69.02\")       Physical Exam  Constitutional:       Appearance: Normal appearance. She is well-developed.   Genitourinary:      Urethral meatus normal.      Right Labia: No tenderness or lesions.     Left Labia: No tenderness or lesions.     No vaginal discharge or tenderness.        Right Adnexa: not tender and not full.     Left Adnexa: not tender and not full.     No cervical motion tenderness or lesion.      Uterus is not enlarged.      No urethral tenderness or hypermobility present.   Breasts:      Right: No mass or nipple discharge.      Left: No mass or nipple discharge.       HENT:      Right Ear: External ear normal.      Left Ear: External ear normal.      Nose: Nose normal.   Eyes:      Conjunctiva/sclera: Conjunctivae normal.   Neck:      Thyroid: No thyromegaly.   Cardiovascular:      Rate and Rhythm: Normal rate and regular rhythm.      Heart sounds: Normal heart sounds.   Pulmonary:      Effort: Pulmonary effort is normal.      Breath sounds: No stridor. No wheezing.   Abdominal:      Palpations: Abdomen is soft. There is no mass.      Tenderness: There is no guarding or rebound.   Musculoskeletal:         General: Normal range of motion.      Cervical back: Normal range of motion and neck supple.   Neurological:      Mental Status: She is alert.      Coordination: Coordination normal.   Skin:     General: Skin is warm and dry.   Psychiatric:         Mood and Affect: Mood normal.         Behavior: Behavior normal.         Thought Content: Thought content normal.         Judgment: Judgment normal.   Vitals reviewed. Exam conducted with a chaperone present.         Diagnoses and all orders for this visit:    1. Visit for gynecologic examination (Primary)        -      Patient to do mammogram.  Discussed breast awareness.        -      Recommended colonoscopy and patient can call me if she reconsiders.     "    -      Patient has not been vaccinated against Covid 19.  Recommended vaccination.  2. History of herpes genitalis  -     acyclovir (ZOVIRAX) 400 MG tablet; Take 1 tablet by mouth Daily. Take no more than 5 doses a day.  Dispense: 90 tablet; Refill: 3  3. Urge incontinence  -     solifenacin (VESIcare) 10 MG tablet; Take 1 tablet by mouth Daily.  Dispense: 90 tablet; Refill: 3  -     No stress incontinence symptoms.  Will switch from Detrol to Vesicare.  4.  Osteopenia        -     DEXA ordered to reassess bone density.    Hector Dugan MD

## 2022-06-13 ENCOUNTER — TELEPHONE (OUTPATIENT)
Dept: OBSTETRICS AND GYNECOLOGY | Facility: CLINIC | Age: 77
End: 2022-06-13

## 2022-06-13 NOTE — TELEPHONE ENCOUNTER
HUB call. AE 05/20/2022. Virginia wants to increase her Solifenacin (vesicare) to 2 10 mg pills daily. [facility] pharmacy on file. Thank you.

## 2022-06-13 NOTE — TELEPHONE ENCOUNTER
"DELETE AFTER REVIEWING: Telephone encounter to be sent to the clinical pool     Caller: VIRGINIA \"DENNIS\" JACQUES    Relationship: SELF    Best call back number: [phone]    What medications are you currently taking:   Current Outpatient Medications on File Prior to Visit   Medication Sig Dispense Refill   • acyclovir (ZOVIRAX) 400 MG tablet Take 1 tablet by mouth Daily. Take no more than 5 doses a day. 90 tablet 3   • alendronate (FOSAMAX) 35 MG tablet   6   • calcium-vitamin D (OSCAL-500) 500-200 MG-UNIT per tablet Take 1 tablet by mouth Daily.     • carbamide peroxide (DEBROX) 6.5 % otic solution Administer 5 drops into ear(s) as directed by provider.     • clobetasol (TEMOVATE) 0.05 % ointment Apply  topically to the appropriate area as directed 2 (Two) Times a Day. 60 g 3   • diclofenac (VOLTAREN) 75 MG EC tablet   1   • levothyroxine (SYNTHROID, LEVOTHROID) 137 MCG tablet   0   • LUMIGAN 0.01 % ophthalmic drops   11   • meclizine (ANTIVERT) 25 MG tablet   6   • Multiple Vitamin (MULTIVITAMIN) tablet Take 1 tablet by mouth.     • nitrofurantoin, macrocrystal-monohydrate, (MACROBID) 100 MG capsule TAKE 1 CAPSULE BY MOUTH TWICE DAILY FOR 7 DAYS 14 capsule 0   • nitrofurantoin, macrocrystal-monohydrate, (MACROBID) 100 MG capsule TAKE 1 CAPSULE BY MOUTH TWICE DAILY FOR 7 DAYS 14 capsule 0   • QUEtiapine (SEROquel) 200 MG tablet Take 300 mg by mouth.     • QUEtiapine (SEROquel) 200 MG tablet Take 400 mg by mouth Daily.     • sertraline (ZOLOFT) 100 MG tablet Take 100 mg by mouth.     • sertraline (ZOLOFT) 100 MG tablet Take 100 mg by mouth Daily.     • solifenacin (VESIcare) 10 MG tablet Take 1 tablet by mouth Daily. 90 tablet 3   • temazepam (RESTORIL) 15 MG capsule   2   • temazepam (RESTORIL) 15 MG capsule Take 30 mg by mouth.     • triamcinolone (KENALOG) 0.5 % ointment APPLY  OINTMENT TOPICALLY TO AFFECTED AREA THREE TIMES DAILY 15 g 0   • triprolidine-pseudoephedrine (APRODINE) 2.5-60 MG tablet per tablet " TK 1 T PO Q 6 H PRF EAR OR NASAL ITCHING       No current facility-administered medications on file prior to visit.          Which medication are you concerned about: solifenacin (VESIcare) 10 MG tab      Who prescribed you this medication: DR. DAVIS    What are your concerns: PT WANTS TO INCREASE TO TWO 10 MG PILLS DAILY

## 2022-06-14 ENCOUNTER — TELEPHONE (OUTPATIENT)
Dept: OBSTETRICS AND GYNECOLOGY | Facility: CLINIC | Age: 77
End: 2022-06-14

## 2022-06-14 NOTE — TELEPHONE ENCOUNTER
Patient is returning provider's call, states she is free any time and would like for a call back from provider if possible.     Thank you!  Irma

## 2022-06-15 NOTE — TELEPHONE ENCOUNTER
PT WANTING TO KNOW IF SHE SHOULD TAKE 1 OR 2 PILLS A DAY OF  solifenacin (VESIcare) 10 MG tablet FOR Urge incontinence AS 1 DOES NOT SEEM TO HELP FOR THE FULL 24 HRS. PT SAID IT IS ALRIGHT TO LVM.

## 2022-06-15 NOTE — TELEPHONE ENCOUNTER
"I spoke with patient.  She has been taking Vesicare for 3 weeks.  She was considering doubling her dose but is on the \"highest\" recognized dose.    I suggested to try to take medication for another 3 weeks before consideration to increase dose.  If she were to increase dose outside of recommended dosing, I would consider only an additional one half tablet.  She feels well on her current regimen with no noted side effects.  "

## 2022-06-15 NOTE — TELEPHONE ENCOUNTER
Pt is calling back once again. I am not sure what this was about. Please advise.    Thanks,  Fortino

## 2022-08-01 ENCOUNTER — APPOINTMENT (OUTPATIENT)
Dept: WOMENS IMAGING | Facility: HOSPITAL | Age: 77
End: 2022-08-01

## 2022-08-01 ENCOUNTER — PROCEDURE VISIT (OUTPATIENT)
Dept: OBSTETRICS AND GYNECOLOGY | Facility: CLINIC | Age: 77
End: 2022-08-01

## 2022-08-01 DIAGNOSIS — Z12.31 VISIT FOR SCREENING MAMMOGRAM: Primary | ICD-10-CM

## 2022-08-01 PROCEDURE — 77067 SCR MAMMO BI INCL CAD: CPT | Performed by: OBSTETRICS & GYNECOLOGY

## 2022-08-01 PROCEDURE — 77067 SCR MAMMO BI INCL CAD: CPT | Performed by: RADIOLOGY

## 2022-08-01 PROCEDURE — 77063 BREAST TOMOSYNTHESIS BI: CPT | Performed by: OBSTETRICS & GYNECOLOGY

## 2022-08-01 PROCEDURE — 77063 BREAST TOMOSYNTHESIS BI: CPT | Performed by: RADIOLOGY

## 2022-11-10 DIAGNOSIS — N39.41 URGE INCONTINENCE: ICD-10-CM

## 2022-11-10 DIAGNOSIS — Z86.19 HISTORY OF HERPES GENITALIS: ICD-10-CM

## 2022-11-10 RX ORDER — ACYCLOVIR 400 MG/1
400 TABLET ORAL DAILY
Qty: 90 TABLET | Refills: 3 | Status: SHIPPED | OUTPATIENT
Start: 2022-11-10 | End: 2022-12-01 | Stop reason: SDUPTHER

## 2022-11-10 RX ORDER — SOLIFENACIN SUCCINATE 10 MG/1
10 TABLET, FILM COATED ORAL DAILY
Qty: 90 TABLET | Refills: 3 | Status: SHIPPED | OUTPATIENT
Start: 2022-11-10 | End: 2023-11-10

## 2022-12-01 ENCOUNTER — TELEPHONE (OUTPATIENT)
Dept: OBSTETRICS AND GYNECOLOGY | Facility: CLINIC | Age: 77
End: 2022-12-01

## 2022-12-01 DIAGNOSIS — Z86.19 HISTORY OF HERPES GENITALIS: ICD-10-CM

## 2022-12-01 NOTE — TELEPHONE ENCOUNTER
Virginia said she is taking 1 per day, maybe 1 extra tablet during the week. She said she may have requested a refill so she did not run out. She was refilling a lot of medications and may have requested the acyclovir also be refilled. Thank you.

## 2022-12-01 NOTE — TELEPHONE ENCOUNTER
Jenny    Can you call her and get clarification on how she is taking Acyclovir?  We received a call from her pharmacy about this.  I will call her pharmacy and correct once you let me know.    Thanks    Kailee

## 2022-12-01 NOTE — TELEPHONE ENCOUNTER
Spoke with Virginia and told her Dr Dugan wants to know how she is taking her acyclovir. States 1 per day and may take 1 extra during the week. States she had to refill a lot of medication recently and may have asked for a refill because she was afraid it was going to  on her. Thank you.

## 2022-12-02 RX ORDER — ACYCLOVIR 400 MG/1
400 TABLET ORAL DAILY
Qty: 90 TABLET | Refills: 3 | Status: SHIPPED | OUTPATIENT
Start: 2022-12-01 | End: 2023-01-12

## 2022-12-06 ENCOUNTER — HOSPITAL ENCOUNTER (OUTPATIENT)
Facility: HOSPITAL | Age: 77
Setting detail: OBSERVATION
Discharge: HOME | End: 2022-12-07
Attending: INTERNAL MEDICINE | Admitting: INTERNAL MEDICINE

## 2022-12-06 ENCOUNTER — APPOINTMENT (OUTPATIENT)
Dept: GENERAL RADIOLOGY | Facility: HOSPITAL | Age: 77
End: 2022-12-06

## 2022-12-06 PROBLEM — G93.41 METABOLIC ENCEPHALOPATHY: Status: ACTIVE | Noted: 2022-12-06

## 2022-12-06 LAB
ANION GAP SERPL CALCULATED.3IONS-SCNC: 10.1 MMOL/L (ref 5–15)
BACTERIA UR QL AUTO: ABNORMAL /HPF
BASOPHILS # BLD AUTO: 0.03 10*3/MM3 (ref 0–0.2)
BASOPHILS NFR BLD AUTO: 0.6 % (ref 0–1.5)
BILIRUB UR QL STRIP: NEGATIVE
BUN SERPL-MCNC: 26 MG/DL (ref 8–23)
BUN/CREAT SERPL: 40 (ref 7–25)
CALCIUM SPEC-SCNC: 8.9 MG/DL (ref 8.6–10.5)
CHLORIDE SERPL-SCNC: 99 MMOL/L (ref 98–107)
CLARITY UR: CLEAR
CO2 SERPL-SCNC: 29.9 MMOL/L (ref 22–29)
COLOR UR: YELLOW
CREAT SERPL-MCNC: 0.65 MG/DL (ref 0.57–1)
CRP SERPL-MCNC: 1.13 MG/DL (ref 0–0.5)
DEPRECATED RDW RBC AUTO: 51 FL (ref 37–54)
EGFRCR SERPLBLD CKD-EPI 2021: 91.9 ML/MIN/1.73
EOSINOPHIL # BLD AUTO: 0.05 10*3/MM3 (ref 0–0.4)
EOSINOPHIL NFR BLD AUTO: 1 % (ref 0.3–6.2)
ERYTHROCYTE [DISTWIDTH] IN BLOOD BY AUTOMATED COUNT: 14.3 % (ref 12.3–15.4)
ERYTHROCYTE [SEDIMENTATION RATE] IN BLOOD: 3 MM/HR (ref 0–30)
GLUCOSE SERPL-MCNC: 145 MG/DL (ref 65–99)
GLUCOSE UR STRIP-MCNC: NEGATIVE MG/DL
HBA1C MFR BLD: 5.3 % (ref 4.8–5.6)
HCT VFR BLD AUTO: 34.8 % (ref 34–46.6)
HGB BLD-MCNC: 11.3 G/DL (ref 12–15.9)
HGB UR QL STRIP.AUTO: NEGATIVE
HYALINE CASTS UR QL AUTO: ABNORMAL /LPF
IMM GRANULOCYTES # BLD AUTO: 0.03 10*3/MM3 (ref 0–0.05)
IMM GRANULOCYTES NFR BLD AUTO: 0.6 % (ref 0–0.5)
KETONES UR QL STRIP: ABNORMAL
LEUKOCYTE ESTERASE UR QL STRIP.AUTO: ABNORMAL
LYMPHOCYTES # BLD AUTO: 1.38 10*3/MM3 (ref 0.7–3.1)
LYMPHOCYTES NFR BLD AUTO: 28.5 % (ref 19.6–45.3)
MCH RBC QN AUTO: 32 PG (ref 26.6–33)
MCHC RBC AUTO-ENTMCNC: 32.5 G/DL (ref 31.5–35.7)
MCV RBC AUTO: 98.6 FL (ref 79–97)
MONOCYTES # BLD AUTO: 0.46 10*3/MM3 (ref 0.1–0.9)
MONOCYTES NFR BLD AUTO: 9.5 % (ref 5–12)
NEUTROPHILS NFR BLD AUTO: 2.89 10*3/MM3 (ref 1.7–7)
NEUTROPHILS NFR BLD AUTO: 59.8 % (ref 42.7–76)
NITRITE UR QL STRIP: NEGATIVE
NRBC BLD AUTO-RTO: 0.2 /100 WBC (ref 0–0.2)
PH UR STRIP.AUTO: 6 [PH] (ref 5–8)
PLATELET # BLD AUTO: 235 10*3/MM3 (ref 140–450)
PMV BLD AUTO: 8.5 FL (ref 6–12)
POTASSIUM SERPL-SCNC: 4 MMOL/L (ref 3.5–5.2)
PROCALCITONIN SERPL-MCNC: 0.05 NG/ML (ref 0–0.25)
PROT UR QL STRIP: ABNORMAL
RBC # BLD AUTO: 3.53 10*6/MM3 (ref 3.77–5.28)
RBC # UR STRIP: ABNORMAL /HPF
REF LAB TEST METHOD: ABNORMAL
SARS-COV-2 ORF1AB RESP QL NAA+PROBE: NOT DETECTED
SODIUM SERPL-SCNC: 139 MMOL/L (ref 136–145)
SP GR UR STRIP: 1.02 (ref 1–1.03)
SQUAMOUS #/AREA URNS HPF: ABNORMAL /HPF
T4 FREE SERPL-MCNC: 0.68 NG/DL (ref 0.93–1.7)
TRANS CELLS #/AREA URNS HPF: ABNORMAL /HPF
TSH SERPL DL<=0.05 MIU/L-ACNC: 0.85 UIU/ML (ref 0.27–4.2)
UROBILINOGEN UR QL STRIP: ABNORMAL
WBC # UR STRIP: ABNORMAL /HPF
WBC NRBC COR # BLD: 4.84 10*3/MM3 (ref 3.4–10.8)

## 2022-12-06 PROCEDURE — 83036 HEMOGLOBIN GLYCOSYLATED A1C: CPT | Performed by: INTERNAL MEDICINE

## 2022-12-06 PROCEDURE — G0378 HOSPITAL OBSERVATION PER HR: HCPCS

## 2022-12-06 PROCEDURE — 85025 COMPLETE CBC W/AUTO DIFF WBC: CPT | Performed by: INTERNAL MEDICINE

## 2022-12-06 PROCEDURE — 84439 ASSAY OF FREE THYROXINE: CPT | Performed by: INTERNAL MEDICINE

## 2022-12-06 PROCEDURE — U0004 COV-19 TEST NON-CDC HGH THRU: HCPCS | Performed by: INTERNAL MEDICINE

## 2022-12-06 PROCEDURE — 81001 URINALYSIS AUTO W/SCOPE: CPT | Performed by: INTERNAL MEDICINE

## 2022-12-06 PROCEDURE — 71045 X-RAY EXAM CHEST 1 VIEW: CPT

## 2022-12-06 PROCEDURE — 86140 C-REACTIVE PROTEIN: CPT | Performed by: INTERNAL MEDICINE

## 2022-12-06 PROCEDURE — 80048 BASIC METABOLIC PNL TOTAL CA: CPT | Performed by: INTERNAL MEDICINE

## 2022-12-06 PROCEDURE — 84443 ASSAY THYROID STIM HORMONE: CPT | Performed by: INTERNAL MEDICINE

## 2022-12-06 PROCEDURE — 85652 RBC SED RATE AUTOMATED: CPT | Performed by: INTERNAL MEDICINE

## 2022-12-06 PROCEDURE — 84145 PROCALCITONIN (PCT): CPT | Performed by: INTERNAL MEDICINE

## 2022-12-06 RX ORDER — TEMAZEPAM 15 MG/1
30 CAPSULE ORAL NIGHTLY
Status: DISCONTINUED | OUTPATIENT
Start: 2022-12-06 | End: 2022-12-06

## 2022-12-06 RX ORDER — LEVOTHYROXINE SODIUM 137 UG/1
137 TABLET ORAL
Status: DISCONTINUED | OUTPATIENT
Start: 2022-12-07 | End: 2022-12-07 | Stop reason: HOSPADM

## 2022-12-06 RX ORDER — SERTRALINE HYDROCHLORIDE 100 MG/1
100 TABLET, FILM COATED ORAL DAILY
Status: DISCONTINUED | OUTPATIENT
Start: 2022-12-06 | End: 2022-12-06

## 2022-12-06 RX ORDER — TEMAZEPAM 15 MG/1
15 CAPSULE ORAL NIGHTLY
Status: DISCONTINUED | OUTPATIENT
Start: 2022-12-06 | End: 2022-12-07 | Stop reason: HOSPADM

## 2022-12-06 RX ORDER — QUETIAPINE FUMARATE 200 MG/1
200 TABLET, FILM COATED ORAL DAILY
Status: DISCONTINUED | OUTPATIENT
Start: 2022-12-06 | End: 2022-12-07 | Stop reason: HOSPADM

## 2022-12-06 RX ORDER — MECLIZINE HYDROCHLORIDE 25 MG/1
25 TABLET ORAL 3 TIMES DAILY PRN
Status: DISCONTINUED | OUTPATIENT
Start: 2022-12-06 | End: 2022-12-07 | Stop reason: HOSPADM

## 2022-12-06 RX ORDER — TEMAZEPAM 15 MG/1
15 CAPSULE ORAL ONCE
Status: COMPLETED | OUTPATIENT
Start: 2022-12-07 | End: 2022-12-06

## 2022-12-06 RX ORDER — LATANOPROST 50 UG/ML
1 SOLUTION/ DROPS OPHTHALMIC NIGHTLY
Status: DISCONTINUED | OUTPATIENT
Start: 2022-12-06 | End: 2022-12-07 | Stop reason: HOSPADM

## 2022-12-06 RX ORDER — SODIUM CHLORIDE 0.9 % (FLUSH) 0.9 %
10 SYRINGE (ML) INJECTION AS NEEDED
Status: DISCONTINUED | OUTPATIENT
Start: 2022-12-06 | End: 2022-12-07 | Stop reason: HOSPADM

## 2022-12-06 RX ADMIN — CALCIUM CARBONATE-VITAMIN D TAB 500 MG-200 UNIT 1 TABLET: 500-200 TAB at 21:40

## 2022-12-06 RX ADMIN — SERTRALINE 100 MG: 100 TABLET, FILM COATED ORAL at 16:50

## 2022-12-06 RX ADMIN — TEMAZEPAM 15 MG: 15 CAPSULE ORAL at 23:54

## 2022-12-06 RX ADMIN — TEMAZEPAM 15 MG: 15 CAPSULE ORAL at 20:23

## 2022-12-06 RX ADMIN — QUETIAPINE FUMARATE 200 MG: 200 TABLET ORAL at 16:50

## 2022-12-06 NOTE — H&P
[facility]   HISTORY AND PHYSICAL    Patient Name: Basia Marquez  : 1945  MRN: [id number]  Primary Care Physician:  Talisha Goldstein MD  Date of admission: 2022    Subjective   Subjective     Chief Complaint:    Patient is seen and evaluated .    History of Present Illness    77 year old white lady is admitted with chief complaints of feeling unwell x 3 - 4 weeks .   Had sore throat to start with about 3 weeks back. None now. . But denies having fever or chills. Had cough earlier on. But denies any cough or sputum in the last couple of weeks. Denies chest pain or palpitations. No SOA.   Denies nausea and vomiting or diarrhea. [facility]. Also has been checked for BPPV and recommended therapy for this recently .      Review of Systems   Constitutional: Positive for activity change, chills and fatigue. Negative for appetite change, diaphoresis, fever and unexpected weight change.   HENT: Positive for hearing loss and tinnitus. Negative for congestion, dental problem, drooling, ear discharge, ear pain, facial swelling, mouth sores, nosebleeds, postnasal drip, rhinorrhea, sinus pressure, sinus pain, sneezing, sore throat, trouble swallowing and voice change.    Eyes: Negative.    Respiratory: Negative for apnea, cough, choking, chest tightness, shortness of breath, wheezing and stridor.    Cardiovascular: Negative for chest pain, palpitations and leg swelling.   Gastrointestinal: Negative for abdominal distention, abdominal pain, blood in stool, constipation, diarrhea, nausea, rectal pain and vomiting.   Endocrine: Negative.    Genitourinary: Negative.    Musculoskeletal: Positive for arthralgias, gait problem, joint swelling and myalgias. Negative for back pain, neck pain and neck stiffness.   Skin: Negative.    Allergic/Immunologic: Negative.    Neurological: Positive for dizziness. Negative for tremors, seizures, syncope, facial asymmetry, speech  difficulty, weakness, light-headedness, numbness and headaches.   Hematological: Negative.    Psychiatric/Behavioral: Positive for confusion and sleep disturbance. Negative for agitation, behavioral problems, decreased concentration, dysphoric mood, hallucinations, self-injury and suicidal ideas. The patient is nervous/anxious. The patient is not hyperactive.         Personal History     Past Medical History:   Diagnosis Date   • Arthritis    • Herpes    • History of hysteroscopy     with endometrial bx   • Lichen sclerosus of female genitalia    • Osteopenia    • STD (female)        Past Surgical History:   Procedure Laterality Date   • BREAST AUGMENTATION     • CYST REMOVAL     • HYSTEROSCOPY      with endometrial bx   • KNEE ARTHROPLASTY     • TONSILLECTOMY     • TUBAL ABDOMINAL LIGATION         Family History: family history includes Arthritis in her brother, brother, father, maternal grandfather, maternal grandmother, mother, paternal grandfather, and sister; Colon cancer in her paternal grandfather; Seizures in her maternal grandmother and mother. Otherwise pertinent FHx was reviewed and not pertinent to current issue.    Social History:  reports that she has never smoked. She has never used smokeless tobacco. She reports current alcohol use. She reports that she does not use drugs.    Home Medications:  QUEtiapine, acyclovir, alendronate, bimatoprost, calcium-vitamin D, carbamide peroxide, clobetasol, diclofenac, levothyroxine, meclizine, multivitamin, nitrofurantoin (macrocrystal-monohydrate), sertraline, solifenacin, temazepam, triamcinolone, and triprolidine-pseudoephedrine    Allergies:  Allergies   Allergen Reactions   • Morphine Hives and Itching     HIVES IN MOUTH       Objective    Objective     Vitals:   Temp:  [98.8 °F (37.1 °C)] 98.8 °F (37.1 °C)  Heart Rate:  [88-92] 92  Resp:  [15-18] 15  BP: (148)/(88) 148/88    Physical Exam  Constitutional:       General: She is not in acute distress.      Appearance: Normal appearance. She is not ill-appearing, toxic-appearing or diaphoretic.   HENT:      Head: Normocephalic.      Nose: Nose normal.      Mouth/Throat:      Mouth: Mucous membranes are moist.   Eyes:      Extraocular Movements: Extraocular movements intact.      Conjunctiva/sclera: Conjunctivae normal.      Pupils: Pupils are equal, round, and reactive to light.   Cardiovascular:      Rate and Rhythm: Normal rate and regular rhythm.      Pulses: Normal pulses.      Heart sounds: Normal heart sounds.   Pulmonary:      Effort: Pulmonary effort is normal. No respiratory distress.      Breath sounds: Normal breath sounds. No wheezing, rhonchi or rales.   Chest:      Chest wall: No tenderness.   Abdominal:      General: Abdomen is flat. There is no distension.      Palpations: Abdomen is soft. There is no mass.      Tenderness: There is no abdominal tenderness. There is no right CVA tenderness, left CVA tenderness, guarding or rebound.      Hernia: No hernia is present.   Musculoskeletal:         General: Swelling, tenderness and deformity present.      Cervical back: Normal range of motion and neck supple.      Right lower leg: No edema.      Left lower leg: No edema.   Lymphadenopathy:      Cervical: No cervical adenopathy.   Skin:     General: Skin is warm and dry.   Neurological:      General: No focal deficit present.      Mental Status: She is alert and oriented to person, place, and time. Mental status is at baseline.      Cranial Nerves: No cranial nerve deficit.      Sensory: No sensory deficit.      Motor: No weakness.      Gait: Gait abnormal.   Psychiatric:         Mood and Affect: Mood normal.         Behavior: Behavior normal.         Thought Content: Thought content normal.         Judgment: Judgment normal.         Result Review    Result Review:  I have personally reviewed the results from the time of this admission to 12/6/2022 14:54 EST and agree with these findings:  [x]  Laboratory list  / accordion  []  Microbiology  [x]  Radiology  []  EKG/Telemetry   []  Cardiology/Vascular   []  Pathology  []  Old records  []  Other:  Most notable findings include:  TSH 0.855     Free T4 0.68 Low      WBC 4.84 10*3/mm3   RBC 3.53 Low  10*6/mm3   Hemoglobin 11.3 Low  g/dL   Hematocrit 34.8 %   MCV 98.6 High  fL   MCH 32.0 pg   MCHC 32.5 g/dL   RDW 14.3 %   RDW-SD 51.0 fl   MPV 8.5 fL   Platelets 235 10*3/mm3   Neutrophil % 59.8 %   Lymphocyte % 28.5 %   Monocyte % 9.5 %   Eosinophil % 1.0 %   Basophil % 0.6 %   Immature Grans % 0.6 High  %   Neutrophils, Absolute 2.89 10*3/mm3   Lymphocytes, Absolute 1.38 10*3/mm3   Monocytes, Absolute 0.46 10*3/mm3   Eosinophils, Absolute 0.05 10*3/mm3   Basophils, Absolute 0.03 10*3/mm3   Immature Grans, Absolute 0.03 10*3/mm3   nRBC 0.2      Component Value   Color, UA Yellow   Appearance, UA Clear   pH, UA 6.0   Specific Gravity, UA 1.020   Glucose, UA Negative   Ketones, UA Trace Abnormal    Bilirubin, UA Negative   Blood, UA Negative   Protein, UA Trace Abnormal    Leuk Esterase, UA Moderate (2+) Abnormal    Nitrite, UA Negative   Urobilinogen, UA 1.0 E.U./dL                       RBC, UA 3-5 Abnormal  /HPF   WBC, UA 6-12 Abnormal  /HPF   Bacteria, UA None Seen /HPF   Squamous Epithelial Cells, UA 0-2 /HPF   Transitional Epithelial Cells, UA 0-2 /HPF   Hyaline Casts, UA 3-6 /LPF   Methodology Manual Light Microscopy       Units   C-Reactive Protein 1.13 High    A single view of the chest was obtained.       Support Devices:  None.   Cardiac Silhouette/Mediastinum/Violeta:  The cardiac silhouette is normal   in size. The descending aorta is tortuous. There is calcific aortic   atherosclerosis.   Lungs/Pleural Spaces:  The lungs and pleural spaces are clear.   Chest Wall/Diaphragm/Upper Abdomen:  There is mild curvature of the   spine. There is multilevel degenerative disc disease. There are   bilateral breast implants. There are suspected intra-articular bodies at   the right  shoulder.         Assessment & Plan   Assessment / Plan     Brief Patient Summary:  Basia Marquez is a 77 y.o. female who was admitted with chief complaints of feeling unwell x 3 - 4 weeks .   Had sore throat to start with about 3 weeks back. None now. . But denies having fever or chills. Had cough earlier on. But denies any cough or sputum in the last couple of weeks. Denies chest pain or palpitations. No SOA.   Denies nausea and vomiting or diarrhea. Has history of chronic dizziness from [place]. Also has been checked for BPPV and recommended therapy for this recently .  Has been feeling increasingly unwell the last 2-3 weeks. Was not sure what was going on . She was getting increasingly agitated since she was feeling so bad.   She takes medications for ANNITA and depression. The last year her medications dose for anxiety and Insomnia has been decreased by her psychiatrist .   She also claimed she was positive for Covid 19 about 3-4 weeks . But was not able to say where she checked this .. But states she is feeling bad since then.  She did not take the Covid Vaccine and does not take Flu vaccine.   Patient was admitted to hospital for increasing agitation and feeling unwell . She is being worked up for Metabolic encephalopathy .      Active Hospital Problems:    1. Metabolic Encephalopathy : Work up is underway currently. Today she looks clinically unremarkable except for her severe crippling  arthritis . She normally takes antiinflammatory medications for this.   2.Mild anemia / mild increase in MCV : will check for iron deficiency and check vitamin B12 and folic acid . Supplement as needed.   3.Hypothyroidism : On high dose of thyroid supplements. Not sure if she is taking this regularly. Will repeat test in about 2 weeks with her medication intake closely for another week to ten days and then increase the dose after checking labs again.   4.Osteopenia : On Alendronate .Had refused repeat bone density  study in the last 2 years.   5.ANNITA/Depression : on Seroquel, Zoloft and Temazepam. Follows with psychiatrist closely.    6.Severe Arthritis : Has crippling arthritis . Her CRP is high from this. takes NSAIDS for her joint pain.       DVT prophylaxis:  No DVT prophylaxis order currently exists.    CODE STATUS:       Admission Status:  I believe this patient meets Observation status.    Talisha Goldstein MD

## 2022-12-06 NOTE — PLAN OF CARE
Goal Outcome Evaluation:  Plan of Care Reviewed With: patient, family        Progress: no change  Outcome Evaluation: VSS. Pt direct admit to [facility] for [facility] care. Pt had covid acouple weeks ago and son has noticed an increase in confusion. Pt can answer all orientation questions however she dose have some confusion and requires nursing staff to repeat things. COVID swab pending. All other labs has resulted. Regular diet. Room air. Son involved in care but not present at bedside. No needs at this time.

## 2022-12-07 ENCOUNTER — APPOINTMENT (OUTPATIENT)
Dept: BONE DENSITY | Facility: HOSPITAL | Age: 77
End: 2022-12-07

## 2022-12-07 ENCOUNTER — APPOINTMENT (OUTPATIENT)
Dept: CT IMAGING | Facility: HOSPITAL | Age: 77
End: 2022-12-07

## 2022-12-07 VITALS
WEIGHT: 135 LBS | RESPIRATION RATE: 16 BRPM | BODY MASS INDEX: 21.19 KG/M2 | TEMPERATURE: 97.7 F | HEIGHT: 67 IN | DIASTOLIC BLOOD PRESSURE: 90 MMHG | HEART RATE: 85 BPM | SYSTOLIC BLOOD PRESSURE: 143 MMHG | OXYGEN SATURATION: 95 %

## 2022-12-07 PROBLEM — G93.41 METABOLIC ENCEPHALOPATHY: Status: RESOLVED | Noted: 2022-12-06 | Resolved: 2022-12-07

## 2022-12-07 LAB
FOLATE SERPL-MCNC: >20 NG/ML (ref 4.78–24.2)
IRON 24H UR-MRATE: 91 MCG/DL (ref 37–145)
IRON SATN MFR SERPL: 31 % (ref 20–50)
TIBC SERPL-MCNC: 297 MCG/DL (ref 298–536)
TRANSFERRIN SERPL-MCNC: 199 MG/DL (ref 200–360)
VIT B12 BLD-MCNC: 501 PG/ML (ref 211–946)

## 2022-12-07 PROCEDURE — 70450 CT HEAD/BRAIN W/O DYE: CPT

## 2022-12-07 PROCEDURE — 83540 ASSAY OF IRON: CPT | Performed by: INTERNAL MEDICINE

## 2022-12-07 PROCEDURE — 84466 ASSAY OF TRANSFERRIN: CPT | Performed by: INTERNAL MEDICINE

## 2022-12-07 PROCEDURE — 82607 VITAMIN B-12: CPT | Performed by: INTERNAL MEDICINE

## 2022-12-07 PROCEDURE — G0378 HOSPITAL OBSERVATION PER HR: HCPCS

## 2022-12-07 PROCEDURE — 82746 ASSAY OF FOLIC ACID SERUM: CPT | Performed by: INTERNAL MEDICINE

## 2022-12-07 RX ORDER — ESCITALOPRAM OXALATE 10 MG/1
10 TABLET ORAL DAILY
Status: DISCONTINUED | OUTPATIENT
Start: 2022-12-07 | End: 2022-12-07 | Stop reason: HOSPADM

## 2022-12-07 RX ORDER — ESCITALOPRAM OXALATE 10 MG/1
10 TABLET ORAL DAILY
Qty: 30 TABLET | Refills: 1 | Status: SHIPPED | OUTPATIENT
Start: 2022-12-07 | End: 2023-02-12

## 2022-12-07 RX ADMIN — LEVOTHYROXINE SODIUM 137 MCG: 0.14 TABLET ORAL at 06:17

## 2022-12-07 RX ADMIN — CALCIUM CARBONATE-VITAMIN D TAB 500 MG-200 UNIT 1 TABLET: 500-200 TAB at 09:20

## 2022-12-07 RX ADMIN — QUETIAPINE FUMARATE 200 MG: 200 TABLET ORAL at 09:20

## 2022-12-07 RX ADMIN — SERTRALINE HYDROCHLORIDE 50 MG: 50 TABLET, FILM COATED ORAL at 09:20

## 2022-12-07 NOTE — DISCHARGE SUMMARY
Basia Marquez      MRN: [id number]  CSN: [id number]    Admission Date : 12/6/2022  Discharge Date : 12/7/2022      1. Metabolic Encephalopathy   2.Mild anemia   3 .Hypothyroidism   4.Osteopenia  5.ANNITA/Depression  6.Severe Arthritis     Brief Patient Hospital Stay Summary:  Basia Marquez is a 77 y.o. female who was admitted with chief complaints of feeling unwell x 3 - 4 weeks .   Had sore throat to start with about 3 weeks back. None now. . But denies having fever or chills. Had cough earlier on. But denies any cough or sputum in the last couple of weeks. Denies chest pain or palpitations. No SOA.   Denies nausea and vomiting or diarrhea. Has history of chronic dizziness from [place]. Also has been checked for BPPV and recommended therapy for this recently .  Had been feeling increasingly unwell the last 2-3 weeks. Was not sure what was going on . She was getting increasingly agitated since she was feeling so bad.   She takes medications for ANNITA and depression. The last year her medications dose for anxiety and Insomnia has been decreased by her psychiatrist .   She also claimed she was positive for Covid 19 about 3-4 weeks . But was not able to say where she checked this .. But stated she was feeling bad since then.  She did not take the Covid Vaccine and does not take Flu vaccine.   Patient was admitted to hospital for increasing agitation and feeling unwell . She was worked up for Metabolic encephalopathy . All infection markers are negative. She also tested negative for Covid.  Her labs showed evidence of mild anemia . Otherwise her electrolytes , blood sugar, renal and LFT were all in normal range. Xray of chest was unremarkable and her O2 saturation was always >94%. CT of the chest was done and this only showed age related changes..   Patient also had mild elevation of blood pressure . It was in 140's systolic and low 90's diastolic pressure. No tachycardia or irregular heart beat.  She takes thyroid supplements. Thyroid study reveals a normal TSH and mildly lower free T4. She had missed 2-3 doses of her thyroid medications. Will recheck the labs in about 10 days and adjust the Levothyroxine dose as needed.   Patient has crippling arthritis and this had been getting worse in the last year. Patient was encouraged to have PT and OT evaluation and treatment. She wanted to do this as OP . Will arrange for her this in [facility] '[facility] to help with gait training and lower extremity muscle strengthening exercises .   Patient was found to be very anxious and also depressed . Will start her on Lexapro 10 mg daily and increase the dose in the next 4-8 weeks based on how she does with this. She takes Seroquel, Temazepam and Zoloft from [facility] psychiatry. Recent review of all  Notes appears like her dose of Seroquel, Temazepam and Zoloft has been decreased and cut into half . Will recommend her to discontinue the Zoloft after cutting the dose 50 mg to 25 mg x 2 weeks . She will continue the Seroquel and Temazepam and adjust dose per her psychiatrist .   She has overactive bladder and takes medications for this. She will continue the medications at home. Follow with the Urologist as recommended by them     Discharge Exam :     Temp:  [98.8 °F (37.1 °C)] 98.8 °F (37.1 °C)  Heart Rate:  [88-92] 92  Resp:  [15-18] 15  BP: (148)/(88) 148/88  SpO2 : 95%     Physical Exam  Constitutional:       General: She is not in acute distress.     Appearance: Normal appearance. She is not ill-appearing, toxic-appearing or diaphoretic.   HENT:      Head: Normocephalic.      Nose: Nose normal.      Mouth/Throat:      Mouth: Mucous membranes are moist.   Eyes:      Extraocular Movements: Extraocular movements intact.      Conjunctiva/sclera: Conjunctivae normal.      Pupils: Pupils are equal, round, and reactive to light.   Cardiovascular:      Rate and Rhythm: Normal rate and regular rhythm.      Pulses: Normal pulses.      Heart  sounds: Normal heart sounds.   Pulmonary:      Effort: Pulmonary effort is normal. No respiratory distress.      Breath sounds: Normal breath sounds. No wheezing, rhonchi or rales.   Chest:      Chest wall: No tenderness.   Abdominal:      General: Abdomen is flat. There is no distension.      Palpations: Abdomen is soft. There is no mass.      Tenderness: There is no abdominal tenderness. There is no right CVA tenderness, left CVA tenderness, guarding or rebound.      Hernia: No hernia is present.   Musculoskeletal:         General: Swelling, tenderness and deformity present.      Cervical back: Normal range of motion and neck supple.      Right lower leg: No edema.      Left lower leg: No edema.   Lymphadenopathy:      Cervical: No cervical adenopathy.   Skin:     General: Skin is warm and dry.   Neurological:      General: No focal deficit present.      Mental Status: She is alert and oriented to person, place, and time. Mental status is at baseline.      Cranial Nerves: No cranial nerve deficit.      Sensory: No sensory deficit.      Motor: No weakness.      Gait: Gait abnormal.   Psychiatric:         Mood and Affect: Mood normal.         Behavior: Behavior normal.         Thought Content: Thought content normal.         Judgment: Judgment normal.     Labs :     Iron 91 mcg/dL   Iron Saturation 31 %   Transferrin 199 Low  mg/dL   TIBC 297 Low       Vitamin B-12 501 pg/mL     Folate >20.00 ng/mL     Glucose 145 High  mg/dL   BUN 26 High  mg/dL   Creatinine 0.65 mg/dL   Sodium 139 mmol/L   Potassium 4.0 mmol/L   Chloride 99 mmol/L   CO2 29.9 High  mmol/L   Calcium 8.9 mg/dL   BUN/Creatinine Ratio 40.0 High     Anion Gap 10.1      Sed Rate 3     Procalcitonin 0.05     Hemoglobin A1C 5.30     C-Reactive Protein 1.13 High      TSH 0.855     Free T4 0.68 Low      WBC 4.84 10*3/mm3   RBC 3.53 Low  10*6/mm3   Hemoglobin 11.3 Low  g/dL   Hematocrit 34.8 %   MCV 98.6 High  fL   MCH 32.0 pg   MCHC 32.5 g/dL   RDW 14.3 %    RDW-SD 51.0 fl   MPV 8.5 fL   Platelets 235 10*3/mm3   Neutrophil % 59.8 %   Lymphocyte % 28.5 %   Monocyte % 9.5 %   Eosinophil % 1.0 %   Basophil % 0.6 %   Immature Grans % 0.6 High  %   Neutrophils, Absolute 2.89 10*3/mm3   Lymphocytes, Absolute 1.38 10*3/mm3   Monocytes, Absolute 0.46 10*3/mm3   Eosinophils, Absolute 0.05 10*3/mm3   Basophils, Absolute 0.03 10*3/mm3   Immature Grans, Absolute 0.03 10*3/mm3   nRBC 0.2 /100 WBC                Color, UA Yellow   Appearance, UA Clear   pH, UA 6.0   Specific Gravity, UA 1.020   Glucose, UA Negative   Ketones, UA Trace Abnormal    Bilirubin, UA Negative   Blood, UA Negative   Protein, UA Trace Abnormal    Leuk Esterase, UA Moderate (2+) Abnormal    Nitrite, UA Negative   Urobilinogen, UA 1.0 E.U./dL     RBC, UA 3-5 Abnormal  /HPF   WBC, UA 6-12 Abnormal  /HPF   Bacteria, UA None Seen /HPF   Squamous Epithelial Cells, UA 0-2 /HPF   Transitional Epithelial Cells, UA 0-2 /HPF   Hyaline Casts, UA 3-6 /LPF   Methodology Manual Light Microscopy      COVID19 Not Detected     Radiology Studies     A single view of the chest was obtained.       Support Devices:  None.   Cardiac Silhouette/Mediastinum/Violeta:  The cardiac silhouette is normal   in size. The descending aorta is tortuous. There is calcific aortic   atherosclerosis.   Lungs/Pleural Spaces:  The lungs and pleural spaces are clear.   Chest Wall/Diaphragm/Upper Abdomen:  There is mild curvature of the   spine. There is multilevel degenerative disc disease. There are   bilateral breast implants. There are suspected intra-articular bodies at   the right shoulder.       CT HEAD WO CONTRAST-       INDICATIONS: Dizziness       TECHNIQUE: Radiation dose reduction techniques were utilized, including   automated exposure control and exposure modulation based on body size.   Noncontrast head CT       COMPARISON: 01/02/2005       FINDINGS:   No acute intracranial hemorrhage, midline shift or mass effect. No acute   territorial  infarct is identified. Small old left basal ganglia lacunar   infarct is apparent.       Mild periventricular hypodensities suggest chronic small vessel ischemic   change in a patient this age.       Thinning of the pituitary/empty sella configuration is noted.       Ventricles, cisterns, cerebral sulci are unremarkable for patient age.       The visualized paranasal sinuses, orbits, mastoid air cells are   unremarkable         Discharge Medications :     Medication Ordered Dose/Rate, Route, Frequency Last Action   calcium 500 mg vitamin D 5 mcg (200 UT) per tablet 1 tablet 1 tablet, PO, Daily Given, 1 tablet at 12/07 0920   escitalopram (LEXAPRO) tablet 10 mg 10 mg, PO, Daily Ordered   latanoprost (XALATAN) 0.005 % ophthalmic solution 1 drop 1 drop, Both Eyes, Nightly Ordered   levothyroxine (SYNTHROID, LEVOTHROID) tablet 137 mcg 137 mcg, PO, Q AM Given, 137 mcg at 12/07 0617   QUEtiapine (SEROquel) tablet 200 mg 200 mg, PO, Daily Given, 200 mg at 12/07 0920   temazepam (RESTORIL) capsule 15 mg     Zoloft 25 mg PO daily   meclizine (ANTIVERT) tablet 25 mg 25 mg, PO, TID PRN     Review the med rec from the nursing staff for complete list of home medications.     Discharge Disposition :     1. Patient is discharged home with recommendation to continue her usual home medications and the new one started this admission.   2.Follow with the psychiatrist to review the medications and have new plan since she is very anxious and states she also feels depressed.   3.PT/OT to evaluate and treat at [facility] as out patient .   4.Follow with me in office in 10 days.   5.Repeat thyroid profile in about 10 days prior to the visit .   6.Continue the iron pill that she takes at home. Bring the pill bottle with next office visit .     Talisha Goldstein MD

## 2022-12-07 NOTE — PLAN OF CARE
Goal Outcome Evaluation:     A&OX3, confused at times up with stand by assist and walker. D/C with planned follow up. Son educated on new medication.

## 2022-12-07 NOTE — PLAN OF CARE
Goal Outcome Evaluation:   Pt is orientex4, confused at times. Standby assistx1, walker, unsteady gait. Urinates frequently, on call light. Appears to be anxious, restless. Received an additional dose of Restoril per Angelita PRIETO, see Mar. Continuous pulse ox on and needs bed alarm, high fall risk. Did not appear to have slept more than a couple hours through the night.

## 2022-12-08 NOTE — PROGRESS NOTES
Case Management Discharge Note      Final Note: Home on 12/7/22. PRATIMA Burns RN, CCP.         Selected Continued Care - Discharged on 12/7/2022 Admission date: 12/6/2022 - Discharge disposition: Home or Self Care    Destination    No services have been selected for the patient.              Durable Medical Equipment    No services have been selected for the patient.              Dialysis/Infusion    No services have been selected for the patient.              Home Medical Care    No services have been selected for the patient.              Therapy    No services have been selected for the patient.              Community Resources    No services have been selected for the patient.              Community & DME    No services have been selected for the patient.                  Transportation Services  Private: Car    Final Discharge Disposition Code: 01 - home or self-care

## 2023-01-12 DIAGNOSIS — Z86.19 HISTORY OF HERPES GENITALIS: ICD-10-CM

## 2023-01-12 RX ORDER — ACYCLOVIR 400 MG/1
TABLET ORAL
Qty: 90 TABLET | Refills: 0 | Status: SHIPPED | OUTPATIENT
Start: 2023-01-12

## 2023-02-12 ENCOUNTER — HOSPITAL ENCOUNTER (INPATIENT)
Facility: HOSPITAL | Age: 78
LOS: 10 days | Discharge: HOME HEALTH | DRG: 522 | End: 2023-02-22
Attending: EMERGENCY MEDICINE | Admitting: INTERNAL MEDICINE
Payer: MEDICARE

## 2023-02-12 ENCOUNTER — APPOINTMENT (OUTPATIENT)
Dept: GENERAL RADIOLOGY | Facility: HOSPITAL | Age: 78
DRG: 522 | End: 2023-02-12
Payer: MEDICARE

## 2023-02-12 DIAGNOSIS — S72.001A CLOSED RIGHT HIP FRACTURE, INITIAL ENCOUNTER: Primary | ICD-10-CM

## 2023-02-12 DIAGNOSIS — T79.6XXA TRAUMATIC RHABDOMYOLYSIS, INITIAL ENCOUNTER: ICD-10-CM

## 2023-02-12 DIAGNOSIS — E87.6 HYPOKALEMIA: ICD-10-CM

## 2023-02-12 LAB
ALBUMIN SERPL-MCNC: 4.2 G/DL (ref 3.5–5.2)
ALBUMIN/GLOB SERPL: 1.8 G/DL
ALP SERPL-CCNC: 72 U/L (ref 39–117)
ALT SERPL W P-5'-P-CCNC: 27 U/L (ref 1–33)
ANION GAP SERPL CALCULATED.3IONS-SCNC: 14 MMOL/L (ref 5–15)
AST SERPL-CCNC: 32 U/L (ref 1–32)
BASOPHILS # BLD AUTO: 0.01 10*3/MM3 (ref 0–0.2)
BASOPHILS NFR BLD AUTO: 0.1 % (ref 0–1.5)
BILIRUB SERPL-MCNC: 0.7 MG/DL (ref 0–1.2)
BUN SERPL-MCNC: 31 MG/DL (ref 8–23)
BUN/CREAT SERPL: 41.9 (ref 7–25)
CALCIUM SPEC-SCNC: 8.6 MG/DL (ref 8.6–10.5)
CHLORIDE SERPL-SCNC: 99 MMOL/L (ref 98–107)
CK SERPL-CCNC: 527 U/L (ref 20–180)
CO2 SERPL-SCNC: 23 MMOL/L (ref 22–29)
CREAT SERPL-MCNC: 0.74 MG/DL (ref 0.57–1)
DEPRECATED RDW RBC AUTO: 58.1 FL (ref 37–54)
EGFRCR SERPLBLD CKD-EPI 2021: 84.5 ML/MIN/1.73
EOSINOPHIL # BLD AUTO: 0.02 10*3/MM3 (ref 0–0.4)
EOSINOPHIL NFR BLD AUTO: 0.2 % (ref 0.3–6.2)
ERYTHROCYTE [DISTWIDTH] IN BLOOD BY AUTOMATED COUNT: 15.7 % (ref 12.3–15.4)
GLOBULIN UR ELPH-MCNC: 2.3 GM/DL
GLUCOSE SERPL-MCNC: 98 MG/DL (ref 65–99)
HCT VFR BLD AUTO: 35.2 % (ref 34–46.6)
HGB BLD-MCNC: 11.9 G/DL (ref 12–15.9)
IMM GRANULOCYTES # BLD AUTO: 0.08 10*3/MM3 (ref 0–0.05)
IMM GRANULOCYTES NFR BLD AUTO: 1 % (ref 0–0.5)
LYMPHOCYTES # BLD AUTO: 1.23 10*3/MM3 (ref 0.7–3.1)
LYMPHOCYTES NFR BLD AUTO: 14.7 % (ref 19.6–45.3)
MAGNESIUM SERPL-MCNC: 1.7 MG/DL (ref 1.6–2.4)
MCH RBC QN AUTO: 34.6 PG (ref 26.6–33)
MCHC RBC AUTO-ENTMCNC: 33.8 G/DL (ref 31.5–35.7)
MCV RBC AUTO: 102.3 FL (ref 79–97)
MONOCYTES # BLD AUTO: 0.57 10*3/MM3 (ref 0.1–0.9)
MONOCYTES NFR BLD AUTO: 6.8 % (ref 5–12)
NEUTROPHILS NFR BLD AUTO: 6.46 10*3/MM3 (ref 1.7–7)
NEUTROPHILS NFR BLD AUTO: 77.2 % (ref 42.7–76)
NRBC BLD AUTO-RTO: 0 /100 WBC (ref 0–0.2)
PLATELET # BLD AUTO: 167 10*3/MM3 (ref 140–450)
PMV BLD AUTO: 9.1 FL (ref 6–12)
POTASSIUM SERPL-SCNC: 3 MMOL/L (ref 3.5–5.2)
PROT SERPL-MCNC: 6.5 G/DL (ref 6–8.5)
RBC # BLD AUTO: 3.44 10*6/MM3 (ref 3.77–5.28)
SODIUM SERPL-SCNC: 136 MMOL/L (ref 136–145)
WBC NRBC COR # BLD: 8.37 10*3/MM3 (ref 3.4–10.8)

## 2023-02-12 PROCEDURE — 73502 X-RAY EXAM HIP UNI 2-3 VIEWS: CPT

## 2023-02-12 PROCEDURE — 83735 ASSAY OF MAGNESIUM: CPT | Performed by: PHYSICIAN ASSISTANT

## 2023-02-12 PROCEDURE — 82550 ASSAY OF CK (CPK): CPT | Performed by: PHYSICIAN ASSISTANT

## 2023-02-12 PROCEDURE — 71045 X-RAY EXAM CHEST 1 VIEW: CPT

## 2023-02-12 PROCEDURE — 83874 ASSAY OF MYOGLOBIN: CPT | Performed by: INTERNAL MEDICINE

## 2023-02-12 PROCEDURE — 85025 COMPLETE CBC W/AUTO DIFF WBC: CPT | Performed by: PHYSICIAN ASSISTANT

## 2023-02-12 PROCEDURE — 25010000002 HYDROMORPHONE PER 4 MG: Performed by: INTERNAL MEDICINE

## 2023-02-12 PROCEDURE — 25010000002 ONDANSETRON PER 1 MG: Performed by: EMERGENCY MEDICINE

## 2023-02-12 PROCEDURE — 80053 COMPREHEN METABOLIC PANEL: CPT | Performed by: PHYSICIAN ASSISTANT

## 2023-02-12 PROCEDURE — 99285 EMERGENCY DEPT VISIT HI MDM: CPT

## 2023-02-12 PROCEDURE — 36415 COLL VENOUS BLD VENIPUNCTURE: CPT

## 2023-02-12 PROCEDURE — 25010000002 HYDROMORPHONE PER 4 MG: Performed by: EMERGENCY MEDICINE

## 2023-02-12 RX ORDER — OXYCODONE AND ACETAMINOPHEN 7.5; 325 MG/1; MG/1
1 TABLET ORAL EVERY 8 HOURS PRN
Status: DISCONTINUED | OUTPATIENT
Start: 2023-02-12 | End: 2023-02-22 | Stop reason: HOSPADM

## 2023-02-12 RX ORDER — HYDROMORPHONE HYDROCHLORIDE 1 MG/ML
0.5 INJECTION, SOLUTION INTRAMUSCULAR; INTRAVENOUS; SUBCUTANEOUS
Status: DISCONTINUED | OUTPATIENT
Start: 2023-02-12 | End: 2023-02-12

## 2023-02-12 RX ORDER — HYDROMORPHONE HYDROCHLORIDE 1 MG/ML
0.5 INJECTION, SOLUTION INTRAMUSCULAR; INTRAVENOUS; SUBCUTANEOUS
Status: ACTIVE | OUTPATIENT
Start: 2023-02-12 | End: 2023-02-14

## 2023-02-12 RX ORDER — POTASSIUM CHLORIDE 750 MG/1
40 TABLET, FILM COATED, EXTENDED RELEASE ORAL ONCE
Status: COMPLETED | OUTPATIENT
Start: 2023-02-12 | End: 2023-02-12

## 2023-02-12 RX ORDER — TEMAZEPAM 15 MG/1
15 CAPSULE ORAL NIGHTLY
Status: COMPLETED | OUTPATIENT
Start: 2023-02-12 | End: 2023-02-18

## 2023-02-12 RX ORDER — QUETIAPINE FUMARATE 200 MG/1
200 TABLET, FILM COATED ORAL EVERY 12 HOURS SCHEDULED
Status: DISCONTINUED | OUTPATIENT
Start: 2023-02-12 | End: 2023-02-22 | Stop reason: HOSPADM

## 2023-02-12 RX ORDER — SODIUM CHLORIDE 450 MG/100ML
100 INJECTION, SOLUTION INTRAVENOUS CONTINUOUS
Status: DISCONTINUED | OUTPATIENT
Start: 2023-02-12 | End: 2023-02-12

## 2023-02-12 RX ORDER — ESCITALOPRAM OXALATE 10 MG/1
10 TABLET ORAL DAILY
Status: DISCONTINUED | OUTPATIENT
Start: 2023-02-12 | End: 2023-02-22 | Stop reason: HOSPADM

## 2023-02-12 RX ORDER — SODIUM CHLORIDE 450 MG/100ML
100 INJECTION, SOLUTION INTRAVENOUS CONTINUOUS
Status: ACTIVE | OUTPATIENT
Start: 2023-02-12 | End: 2023-02-15

## 2023-02-12 RX ORDER — CEFAZOLIN SODIUM 2 G/100ML
2 INJECTION, SOLUTION INTRAVENOUS
Status: DISCONTINUED | OUTPATIENT
Start: 2023-02-13 | End: 2023-02-14

## 2023-02-12 RX ORDER — ONDANSETRON 2 MG/ML
4 INJECTION INTRAMUSCULAR; INTRAVENOUS ONCE
Status: COMPLETED | OUTPATIENT
Start: 2023-02-12 | End: 2023-02-12

## 2023-02-12 RX ORDER — HYDROMORPHONE HYDROCHLORIDE 1 MG/ML
0.5 INJECTION, SOLUTION INTRAMUSCULAR; INTRAVENOUS; SUBCUTANEOUS ONCE
Status: COMPLETED | OUTPATIENT
Start: 2023-02-12 | End: 2023-02-12

## 2023-02-12 RX ADMIN — HYDROMORPHONE HYDROCHLORIDE 0.5 MG: 1 INJECTION, SOLUTION INTRAMUSCULAR; INTRAVENOUS; SUBCUTANEOUS at 16:53

## 2023-02-12 RX ADMIN — SODIUM CHLORIDE 100 ML/HR: 4.5 INJECTION, SOLUTION INTRAVENOUS at 23:13

## 2023-02-12 RX ADMIN — SODIUM CHLORIDE 500 ML: 9 INJECTION, SOLUTION INTRAVENOUS at 16:49

## 2023-02-12 RX ADMIN — QUETIAPINE FUMARATE 200 MG: 200 TABLET ORAL at 23:11

## 2023-02-12 RX ADMIN — TEMAZEPAM 15 MG: 15 CAPSULE ORAL at 23:21

## 2023-02-12 RX ADMIN — ONDANSETRON 4 MG: 2 INJECTION INTRAMUSCULAR; INTRAVENOUS at 16:52

## 2023-02-12 RX ADMIN — ESCITALOPRAM 10 MG: 10 TABLET, FILM COATED ORAL at 23:11

## 2023-02-12 RX ADMIN — SODIUM CHLORIDE 100 ML/HR: 4.5 INJECTION, SOLUTION INTRAVENOUS at 18:37

## 2023-02-12 RX ADMIN — POTASSIUM CHLORIDE 40 MEQ: 750 TABLET, EXTENDED RELEASE ORAL at 18:36

## 2023-02-12 RX ADMIN — HYDROMORPHONE HYDROCHLORIDE 0.5 MG: 1 INJECTION, SOLUTION INTRAMUSCULAR; INTRAVENOUS; SUBCUTANEOUS at 20:18

## 2023-02-13 LAB
ANION GAP SERPL CALCULATED.3IONS-SCNC: 7.5 MMOL/L (ref 5–15)
BASOPHILS # BLD AUTO: 0.01 10*3/MM3 (ref 0–0.2)
BASOPHILS NFR BLD AUTO: 0.2 % (ref 0–1.5)
BUN SERPL-MCNC: 27 MG/DL (ref 8–23)
BUN/CREAT SERPL: 47.4 (ref 7–25)
CALCIUM SPEC-SCNC: 7.8 MG/DL (ref 8.6–10.5)
CHLORIDE SERPL-SCNC: 107 MMOL/L (ref 98–107)
CK SERPL-CCNC: 363 U/L (ref 20–180)
CO2 SERPL-SCNC: 21.5 MMOL/L (ref 22–29)
CREAT SERPL-MCNC: 0.57 MG/DL (ref 0.57–1)
DEPRECATED RDW RBC AUTO: 55.2 FL (ref 37–54)
EGFRCR SERPLBLD CKD-EPI 2021: 94.9 ML/MIN/1.73
EOSINOPHIL # BLD AUTO: 0.03 10*3/MM3 (ref 0–0.4)
EOSINOPHIL NFR BLD AUTO: 0.6 % (ref 0.3–6.2)
ERYTHROCYTE [DISTWIDTH] IN BLOOD BY AUTOMATED COUNT: 14.9 % (ref 12.3–15.4)
GLUCOSE SERPL-MCNC: 86 MG/DL (ref 65–99)
HCT VFR BLD AUTO: 31.4 % (ref 34–46.6)
HGB BLD-MCNC: 10.4 G/DL (ref 12–15.9)
IMM GRANULOCYTES # BLD AUTO: 0.02 10*3/MM3 (ref 0–0.05)
IMM GRANULOCYTES NFR BLD AUTO: 0.4 % (ref 0–0.5)
LYMPHOCYTES # BLD AUTO: 1.19 10*3/MM3 (ref 0.7–3.1)
LYMPHOCYTES NFR BLD AUTO: 22.2 % (ref 19.6–45.3)
MCH RBC QN AUTO: 33.7 PG (ref 26.6–33)
MCHC RBC AUTO-ENTMCNC: 33.1 G/DL (ref 31.5–35.7)
MCV RBC AUTO: 101.6 FL (ref 79–97)
MONOCYTES # BLD AUTO: 0.4 10*3/MM3 (ref 0.1–0.9)
MONOCYTES NFR BLD AUTO: 7.4 % (ref 5–12)
NEUTROPHILS NFR BLD AUTO: 3.72 10*3/MM3 (ref 1.7–7)
NEUTROPHILS NFR BLD AUTO: 69.2 % (ref 42.7–76)
NRBC BLD AUTO-RTO: 0 /100 WBC (ref 0–0.2)
PLATELET # BLD AUTO: 144 10*3/MM3 (ref 140–450)
PMV BLD AUTO: 9.2 FL (ref 6–12)
POTASSIUM SERPL-SCNC: 4 MMOL/L (ref 3.5–5.2)
RBC # BLD AUTO: 3.09 10*6/MM3 (ref 3.77–5.28)
SODIUM SERPL-SCNC: 136 MMOL/L (ref 136–145)
URINE MYOGLOBIN, QUALITATIVE: POSITIVE
WBC NRBC COR # BLD: 5.37 10*3/MM3 (ref 3.4–10.8)

## 2023-02-13 PROCEDURE — 82550 ASSAY OF CK (CPK): CPT | Performed by: INTERNAL MEDICINE

## 2023-02-13 PROCEDURE — 80048 BASIC METABOLIC PNL TOTAL CA: CPT | Performed by: INTERNAL MEDICINE

## 2023-02-13 PROCEDURE — 85025 COMPLETE CBC W/AUTO DIFF WBC: CPT | Performed by: INTERNAL MEDICINE

## 2023-02-13 RX ORDER — OXYBUTYNIN CHLORIDE 10 MG/1
10 TABLET, EXTENDED RELEASE ORAL DAILY
Status: DISCONTINUED | OUTPATIENT
Start: 2023-02-13 | End: 2023-02-22 | Stop reason: HOSPADM

## 2023-02-13 RX ORDER — LEVOTHYROXINE SODIUM 137 UG/1
137 TABLET ORAL
Status: DISCONTINUED | OUTPATIENT
Start: 2023-02-13 | End: 2023-02-22 | Stop reason: HOSPADM

## 2023-02-13 RX ORDER — DIPHENOXYLATE HYDROCHLORIDE AND ATROPINE SULFATE 2.5; .025 MG/1; MG/1
1 TABLET ORAL DAILY
Status: DISCONTINUED | OUTPATIENT
Start: 2023-02-13 | End: 2023-02-22 | Stop reason: HOSPADM

## 2023-02-13 RX ORDER — NAPROXEN 500 MG/1
500 TABLET ORAL 2 TIMES DAILY WITH MEALS
Status: DISCONTINUED | OUTPATIENT
Start: 2023-02-13 | End: 2023-02-16

## 2023-02-13 RX ADMIN — QUETIAPINE FUMARATE 200 MG: 200 TABLET ORAL at 22:41

## 2023-02-13 RX ADMIN — SODIUM CHLORIDE 100 ML/HR: 4.5 INJECTION, SOLUTION INTRAVENOUS at 04:51

## 2023-02-13 RX ADMIN — CALCIUM CARBONATE-VITAMIN D TAB 500 MG-200 UNIT 2 TABLET: 500-200 TAB at 22:41

## 2023-02-13 RX ADMIN — OXYBUTYNIN CHLORIDE 10 MG: 10 TABLET, EXTENDED RELEASE ORAL at 22:40

## 2023-02-13 RX ADMIN — NAPROXEN 500 MG: 500 TABLET ORAL at 22:41

## 2023-02-13 RX ADMIN — TEMAZEPAM 15 MG: 15 CAPSULE ORAL at 22:40

## 2023-02-14 ENCOUNTER — ANESTHESIA EVENT (OUTPATIENT)
Dept: PERIOP | Facility: HOSPITAL | Age: 78
DRG: 522 | End: 2023-02-14
Payer: MEDICARE

## 2023-02-14 ENCOUNTER — APPOINTMENT (OUTPATIENT)
Dept: GENERAL RADIOLOGY | Facility: HOSPITAL | Age: 78
DRG: 522 | End: 2023-02-14
Payer: MEDICARE

## 2023-02-14 ENCOUNTER — ANESTHESIA (OUTPATIENT)
Dept: PERIOP | Facility: HOSPITAL | Age: 78
DRG: 522 | End: 2023-02-14
Payer: MEDICARE

## 2023-02-14 LAB
ANION GAP SERPL CALCULATED.3IONS-SCNC: 6 MMOL/L (ref 5–15)
BUN SERPL-MCNC: 14 MG/DL (ref 8–23)
BUN/CREAT SERPL: 25.9 (ref 7–25)
CALCIUM SPEC-SCNC: 9 MG/DL (ref 8.6–10.5)
CHLORIDE SERPL-SCNC: 108 MMOL/L (ref 98–107)
CK SERPL-CCNC: 241 U/L (ref 20–180)
CO2 SERPL-SCNC: 24 MMOL/L (ref 22–29)
CREAT SERPL-MCNC: 0.54 MG/DL (ref 0.57–1)
EGFRCR SERPLBLD CKD-EPI 2021: 96.1 ML/MIN/1.73
GLUCOSE SERPL-MCNC: 98 MG/DL (ref 65–99)
POTASSIUM SERPL-SCNC: 3.3 MMOL/L (ref 3.5–5.2)
SODIUM SERPL-SCNC: 138 MMOL/L (ref 136–145)
T4 FREE SERPL-MCNC: 0.22 NG/DL (ref 0.93–1.7)
TSH SERPL DL<=0.05 MIU/L-ACNC: 128 UIU/ML (ref 0.27–4.2)

## 2023-02-14 PROCEDURE — 84443 ASSAY THYROID STIM HORMONE: CPT | Performed by: INTERNAL MEDICINE

## 2023-02-14 PROCEDURE — 25010000002 PROPOFOL 10 MG/ML EMULSION: Performed by: NURSE ANESTHETIST, CERTIFIED REGISTERED

## 2023-02-14 PROCEDURE — 25010000002 MAGNESIUM SULFATE PER 500 MG OF MAGNESIUM: Performed by: NURSE ANESTHETIST, CERTIFIED REGISTERED

## 2023-02-14 PROCEDURE — 72170 X-RAY EXAM OF PELVIS: CPT

## 2023-02-14 PROCEDURE — 84439 ASSAY OF FREE THYROXINE: CPT | Performed by: INTERNAL MEDICINE

## 2023-02-14 PROCEDURE — 25010000002 CEFAZOLIN IN DEXTROSE 2-4 GM/100ML-% SOLUTION: Performed by: ORTHOPAEDIC SURGERY

## 2023-02-14 PROCEDURE — 82550 ASSAY OF CK (CPK): CPT | Performed by: INTERNAL MEDICINE

## 2023-02-14 PROCEDURE — 76000 FLUOROSCOPY <1 HR PHYS/QHP: CPT

## 2023-02-14 PROCEDURE — 25010000002 EPINEPHRINE 1 MG/ML SOLUTION 30 ML VIAL: Performed by: ORTHOPAEDIC SURGERY

## 2023-02-14 PROCEDURE — 25010000002 FENTANYL CITRATE (PF) 50 MCG/ML SOLUTION: Performed by: NURSE ANESTHETIST, CERTIFIED REGISTERED

## 2023-02-14 PROCEDURE — 25010000002 KETOROLAC TROMETHAMINE PER 15 MG: Performed by: ORTHOPAEDIC SURGERY

## 2023-02-14 PROCEDURE — 73501 X-RAY EXAM HIP UNI 1 VIEW: CPT

## 2023-02-14 PROCEDURE — 25010000002 DEXAMETHASONE SODIUM PHOSPHATE 20 MG/5ML SOLUTION: Performed by: NURSE ANESTHETIST, CERTIFIED REGISTERED

## 2023-02-14 PROCEDURE — 25010000002 ONDANSETRON PER 1 MG: Performed by: NURSE ANESTHETIST, CERTIFIED REGISTERED

## 2023-02-14 PROCEDURE — 25010000002 FENTANYL CITRATE (PF) 50 MCG/ML SOLUTION: Performed by: ANESTHESIOLOGY

## 2023-02-14 PROCEDURE — 80048 BASIC METABOLIC PNL TOTAL CA: CPT | Performed by: INTERNAL MEDICINE

## 2023-02-14 PROCEDURE — C1776 JOINT DEVICE (IMPLANTABLE): HCPCS | Performed by: ORTHOPAEDIC SURGERY

## 2023-02-14 PROCEDURE — C1713 ANCHOR/SCREW BN/BN,TIS/BN: HCPCS | Performed by: ORTHOPAEDIC SURGERY

## 2023-02-14 PROCEDURE — 0SR9069 REPLACEMENT OF RIGHT HIP JOINT WITH OXIDIZED ZIRCONIUM ON POLYETHYLENE SYNTHETIC SUBSTITUTE, CEMENTED, OPEN APPROACH: ICD-10-PCS | Performed by: ORTHOPAEDIC SURGERY

## 2023-02-14 PROCEDURE — 25010000002 CLONIDINE PER 1 MG: Performed by: ORTHOPAEDIC SURGERY

## 2023-02-14 PROCEDURE — 25010000002 ROPIVACAINE PER 1 MG: Performed by: ORTHOPAEDIC SURGERY

## 2023-02-14 DEVICE — OR3O DUAL MOBILITY LINER 40/52
Type: IMPLANTABLE DEVICE | Site: HIP | Status: FUNCTIONAL
Brand: OR3O DUAL MOBILITY

## 2023-02-14 DEVICE — OXINIUM FEMORAL HEAD 12/14 TAPER                                    28 MM +0
Type: IMPLANTABLE DEVICE | Site: HIP | Status: FUNCTIONAL
Brand: OXINIUM

## 2023-02-14 DEVICE — OR3O DUAL MOBILITY XLPE INSERT 28/40
Type: IMPLANTABLE DEVICE | Site: HIP | Status: FUNCTIONAL
Brand: OR3O DUAL MOBILITY

## 2023-02-14 DEVICE — IMPLANTABLE DEVICE: Type: IMPLANTABLE DEVICE | Status: FUNCTIONAL

## 2023-02-14 DEVICE — R3 3 HOLE ACETABULAR SHELL 52MM
Type: IMPLANTABLE DEVICE | Site: HIP | Status: FUNCTIONAL
Brand: R3 ACETABULAR

## 2023-02-14 DEVICE — PALACOS® R IS A FAST-CURING, RADIOPAQUE, POLY(METHYL METHACRYLATE)-BASED BONE CEMENT.PALACOS ® R CONTAINS THE X-RAY CONTRAST MEDIUM ZIRCONIUM DIOXIDE. TO IMPROVE VISIBILITY IN THE SURGICAL FIELD PALACOS ® R HAS BEEN COLOURED WITH CHLOROPHYLL (E141). THE BONE CEMENT IS PREPARED DIRECTLY BEFORE USE BY MIXING A POLYMER POWDER COMPONENT WITH A LIQUID MONOMER COMPONENT. A DUCTILE DOUGH FORMS WHICH CURES WITHIN A FEW MINUTES.
Type: IMPLANTABLE DEVICE | Site: HIP | Status: FUNCTIONAL
Brand: PALACOS®

## 2023-02-14 DEVICE — KNOTLESS TISSUE CONTROL DEVICE, UNDYED UNIDIRECTIONAL (ANTIBACTERIAL) SYNTHETIC ABSORBABLE DEVICE
Type: IMPLANTABLE DEVICE | Site: HIP | Status: FUNCTIONAL
Brand: STRATAFIX

## 2023-02-14 DEVICE — POLARSTEM STANDARD CEMENTED 3
Type: IMPLANTABLE DEVICE | Site: HIP | Status: FUNCTIONAL
Brand: POLARSTEM

## 2023-02-14 RX ORDER — DEXAMETHASONE SODIUM PHOSPHATE 4 MG/ML
INJECTION, SOLUTION INTRA-ARTICULAR; INTRALESIONAL; INTRAMUSCULAR; INTRAVENOUS; SOFT TISSUE AS NEEDED
Status: DISCONTINUED | OUTPATIENT
Start: 2023-02-14 | End: 2023-02-14 | Stop reason: SURG

## 2023-02-14 RX ORDER — SODIUM CHLORIDE 9 MG/ML
40 INJECTION, SOLUTION INTRAVENOUS AS NEEDED
Status: DISCONTINUED | OUTPATIENT
Start: 2023-02-14 | End: 2023-02-14 | Stop reason: HOSPADM

## 2023-02-14 RX ORDER — POTASSIUM CHLORIDE 750 MG/1
40 TABLET, FILM COATED, EXTENDED RELEASE ORAL ONCE
Status: COMPLETED | OUTPATIENT
Start: 2023-02-14 | End: 2023-02-14

## 2023-02-14 RX ORDER — PHENYLEPHRINE HCL IN 0.9% NACL 1 MG/10 ML
SYRINGE (ML) INTRAVENOUS AS NEEDED
Status: DISCONTINUED | OUTPATIENT
Start: 2023-02-14 | End: 2023-02-14 | Stop reason: SURG

## 2023-02-14 RX ORDER — EPHEDRINE SULFATE 50 MG/ML
5 INJECTION, SOLUTION INTRAVENOUS ONCE AS NEEDED
Status: DISCONTINUED | OUTPATIENT
Start: 2023-02-14 | End: 2023-02-14 | Stop reason: HOSPADM

## 2023-02-14 RX ORDER — HYDRALAZINE HYDROCHLORIDE 20 MG/ML
5 INJECTION INTRAMUSCULAR; INTRAVENOUS
Status: DISCONTINUED | OUTPATIENT
Start: 2023-02-14 | End: 2023-02-14 | Stop reason: HOSPADM

## 2023-02-14 RX ORDER — CEFAZOLIN SODIUM 2 G/100ML
2 INJECTION, SOLUTION INTRAVENOUS ONCE
Status: COMPLETED | OUTPATIENT
Start: 2023-02-14 | End: 2023-02-14

## 2023-02-14 RX ORDER — ROCURONIUM BROMIDE 10 MG/ML
INJECTION, SOLUTION INTRAVENOUS AS NEEDED
Status: DISCONTINUED | OUTPATIENT
Start: 2023-02-14 | End: 2023-02-14 | Stop reason: SURG

## 2023-02-14 RX ORDER — FENTANYL CITRATE 50 UG/ML
50 INJECTION, SOLUTION INTRAMUSCULAR; INTRAVENOUS
Status: DISCONTINUED | OUTPATIENT
Start: 2023-02-14 | End: 2023-02-14 | Stop reason: HOSPADM

## 2023-02-14 RX ORDER — ONDANSETRON 2 MG/ML
INJECTION INTRAMUSCULAR; INTRAVENOUS AS NEEDED
Status: DISCONTINUED | OUTPATIENT
Start: 2023-02-14 | End: 2023-02-14 | Stop reason: SURG

## 2023-02-14 RX ORDER — PROMETHAZINE HYDROCHLORIDE 25 MG/1
25 TABLET ORAL ONCE AS NEEDED
Status: DISCONTINUED | OUTPATIENT
Start: 2023-02-14 | End: 2023-02-14 | Stop reason: HOSPADM

## 2023-02-14 RX ORDER — HYDROCODONE BITARTRATE AND ACETAMINOPHEN 7.5; 325 MG/1; MG/1
1 TABLET ORAL ONCE AS NEEDED
Status: DISCONTINUED | OUTPATIENT
Start: 2023-02-14 | End: 2023-02-14 | Stop reason: HOSPADM

## 2023-02-14 RX ORDER — PROPOFOL 10 MG/ML
VIAL (ML) INTRAVENOUS AS NEEDED
Status: DISCONTINUED | OUTPATIENT
Start: 2023-02-14 | End: 2023-02-14 | Stop reason: SURG

## 2023-02-14 RX ORDER — SODIUM CHLORIDE 0.9 % (FLUSH) 0.9 %
10 SYRINGE (ML) INJECTION EVERY 12 HOURS SCHEDULED
Status: DISCONTINUED | OUTPATIENT
Start: 2023-02-14 | End: 2023-02-14 | Stop reason: HOSPADM

## 2023-02-14 RX ORDER — NALOXONE HCL 0.4 MG/ML
0.2 VIAL (ML) INJECTION AS NEEDED
Status: DISCONTINUED | OUTPATIENT
Start: 2023-02-14 | End: 2023-02-14 | Stop reason: HOSPADM

## 2023-02-14 RX ORDER — SODIUM CHLORIDE 0.9 % (FLUSH) 0.9 %
10 SYRINGE (ML) INJECTION AS NEEDED
Status: DISCONTINUED | OUTPATIENT
Start: 2023-02-14 | End: 2023-02-14 | Stop reason: HOSPADM

## 2023-02-14 RX ORDER — LABETALOL HYDROCHLORIDE 5 MG/ML
5 INJECTION, SOLUTION INTRAVENOUS
Status: DISCONTINUED | OUTPATIENT
Start: 2023-02-14 | End: 2023-02-14 | Stop reason: HOSPADM

## 2023-02-14 RX ORDER — MAGNESIUM SULFATE HEPTAHYDRATE 500 MG/ML
INJECTION, SOLUTION INTRAMUSCULAR; INTRAVENOUS AS NEEDED
Status: DISCONTINUED | OUTPATIENT
Start: 2023-02-14 | End: 2023-02-14 | Stop reason: SURG

## 2023-02-14 RX ORDER — TRANEXAMIC ACID 100 MG/ML
INJECTION, SOLUTION INTRAVENOUS AS NEEDED
Status: DISCONTINUED | OUTPATIENT
Start: 2023-02-14 | End: 2023-02-14 | Stop reason: SURG

## 2023-02-14 RX ORDER — CEFAZOLIN SODIUM 2 G/100ML
2 INJECTION, SOLUTION INTRAVENOUS EVERY 8 HOURS
Status: COMPLETED | OUTPATIENT
Start: 2023-02-14 | End: 2023-02-15

## 2023-02-14 RX ORDER — OXYCODONE AND ACETAMINOPHEN 7.5; 325 MG/1; MG/1
1 TABLET ORAL EVERY 4 HOURS PRN
Status: DISCONTINUED | OUTPATIENT
Start: 2023-02-14 | End: 2023-02-14 | Stop reason: HOSPADM

## 2023-02-14 RX ORDER — FLUMAZENIL 0.1 MG/ML
0.2 INJECTION INTRAVENOUS AS NEEDED
Status: DISCONTINUED | OUTPATIENT
Start: 2023-02-14 | End: 2023-02-14 | Stop reason: HOSPADM

## 2023-02-14 RX ORDER — DIPHENHYDRAMINE HYDROCHLORIDE 50 MG/ML
12.5 INJECTION INTRAMUSCULAR; INTRAVENOUS
Status: DISCONTINUED | OUTPATIENT
Start: 2023-02-14 | End: 2023-02-14 | Stop reason: HOSPADM

## 2023-02-14 RX ORDER — MAGNESIUM HYDROXIDE 1200 MG/15ML
LIQUID ORAL AS NEEDED
Status: DISCONTINUED | OUTPATIENT
Start: 2023-02-14 | End: 2023-02-14 | Stop reason: HOSPADM

## 2023-02-14 RX ORDER — SODIUM CHLORIDE, SODIUM LACTATE, POTASSIUM CHLORIDE, CALCIUM CHLORIDE 600; 310; 30; 20 MG/100ML; MG/100ML; MG/100ML; MG/100ML
9 INJECTION, SOLUTION INTRAVENOUS CONTINUOUS
Status: DISCONTINUED | OUTPATIENT
Start: 2023-02-14 | End: 2023-02-22 | Stop reason: HOSPADM

## 2023-02-14 RX ORDER — DIPHENHYDRAMINE HCL 25 MG
25 CAPSULE ORAL
Status: DISCONTINUED | OUTPATIENT
Start: 2023-02-14 | End: 2023-02-14 | Stop reason: HOSPADM

## 2023-02-14 RX ORDER — FENTANYL CITRATE 50 UG/ML
INJECTION, SOLUTION INTRAMUSCULAR; INTRAVENOUS AS NEEDED
Status: DISCONTINUED | OUTPATIENT
Start: 2023-02-14 | End: 2023-02-14 | Stop reason: SURG

## 2023-02-14 RX ORDER — ONDANSETRON 2 MG/ML
4 INJECTION INTRAMUSCULAR; INTRAVENOUS ONCE AS NEEDED
Status: DISCONTINUED | OUTPATIENT
Start: 2023-02-14 | End: 2023-02-14 | Stop reason: HOSPADM

## 2023-02-14 RX ORDER — PROMETHAZINE HYDROCHLORIDE 25 MG/1
25 SUPPOSITORY RECTAL ONCE AS NEEDED
Status: DISCONTINUED | OUTPATIENT
Start: 2023-02-14 | End: 2023-02-14 | Stop reason: HOSPADM

## 2023-02-14 RX ORDER — MIDAZOLAM HYDROCHLORIDE 1 MG/ML
0.5 INJECTION INTRAMUSCULAR; INTRAVENOUS
Status: DISCONTINUED | OUTPATIENT
Start: 2023-02-14 | End: 2023-02-14 | Stop reason: HOSPADM

## 2023-02-14 RX ORDER — LIDOCAINE HYDROCHLORIDE 20 MG/ML
INJECTION, SOLUTION INFILTRATION; PERINEURAL AS NEEDED
Status: DISCONTINUED | OUTPATIENT
Start: 2023-02-14 | End: 2023-02-14 | Stop reason: SURG

## 2023-02-14 RX ORDER — ASPIRIN 81 MG/1
81 TABLET, CHEWABLE ORAL 2 TIMES DAILY
Status: DISCONTINUED | OUTPATIENT
Start: 2023-02-14 | End: 2023-02-22 | Stop reason: HOSPADM

## 2023-02-14 RX ORDER — HYDROMORPHONE HYDROCHLORIDE 1 MG/ML
0.5 INJECTION, SOLUTION INTRAMUSCULAR; INTRAVENOUS; SUBCUTANEOUS
Status: DISCONTINUED | OUTPATIENT
Start: 2023-02-14 | End: 2023-02-14 | Stop reason: HOSPADM

## 2023-02-14 RX ORDER — FAMOTIDINE 10 MG/ML
20 INJECTION, SOLUTION INTRAVENOUS ONCE
Status: COMPLETED | OUTPATIENT
Start: 2023-02-14 | End: 2023-02-14

## 2023-02-14 RX ADMIN — FAMOTIDINE 20 MG: 10 INJECTION INTRAVENOUS at 06:28

## 2023-02-14 RX ADMIN — ONDANSETRON 4 MG: 2 INJECTION INTRAMUSCULAR; INTRAVENOUS at 08:03

## 2023-02-14 RX ADMIN — SUGAMMADEX 200 MG: 100 INJECTION, SOLUTION INTRAVENOUS at 08:08

## 2023-02-14 RX ADMIN — QUETIAPINE FUMARATE 200 MG: 200 TABLET ORAL at 20:33

## 2023-02-14 RX ADMIN — Medication 100 MCG: at 07:28

## 2023-02-14 RX ADMIN — FENTANYL CITRATE 50 MCG: 50 INJECTION, SOLUTION INTRAMUSCULAR; INTRAVENOUS at 06:29

## 2023-02-14 RX ADMIN — Medication 100 MCG: at 08:00

## 2023-02-14 RX ADMIN — PROPOFOL 120 MG: 10 INJECTION, EMULSION INTRAVENOUS at 07:16

## 2023-02-14 RX ADMIN — Medication 200 MCG: at 08:03

## 2023-02-14 RX ADMIN — OXYCODONE HYDROCHLORIDE AND ACETAMINOPHEN 1 TABLET: 7.5; 325 TABLET ORAL at 20:33

## 2023-02-14 RX ADMIN — Medication 100 MCG: at 07:20

## 2023-02-14 RX ADMIN — FENTANYL CITRATE 50 MCG: 50 INJECTION, SOLUTION INTRAMUSCULAR; INTRAVENOUS at 07:12

## 2023-02-14 RX ADMIN — ROCURONIUM BROMIDE 50 MG: 10 INJECTION INTRAVENOUS at 07:17

## 2023-02-14 RX ADMIN — DEXAMETHASONE SODIUM PHOSPHATE 8 MG: 4 INJECTION, SOLUTION INTRAMUSCULAR; INTRAVENOUS at 07:42

## 2023-02-14 RX ADMIN — CEFAZOLIN SODIUM 2 G: 2 INJECTION, SOLUTION INTRAVENOUS at 21:00

## 2023-02-14 RX ADMIN — ASPIRIN 81 MG: 81 TABLET, CHEWABLE ORAL at 20:33

## 2023-02-14 RX ADMIN — CALCIUM CARBONATE-VITAMIN D TAB 500 MG-200 UNIT 2 TABLET: 500-200 TAB at 20:33

## 2023-02-14 RX ADMIN — TRANEXAMIC ACID 1000 MG: 100 INJECTION INTRAVENOUS at 07:27

## 2023-02-14 RX ADMIN — POTASSIUM CHLORIDE 40 MEQ: 750 TABLET, EXTENDED RELEASE ORAL at 16:33

## 2023-02-14 RX ADMIN — Medication 5 DROP: at 20:33

## 2023-02-14 RX ADMIN — NAPROXEN 500 MG: 500 TABLET ORAL at 17:23

## 2023-02-14 RX ADMIN — CEFAZOLIN SODIUM 2 G: 2 INJECTION, SOLUTION INTRAVENOUS at 06:57

## 2023-02-14 RX ADMIN — MAGNESIUM SULFATE HEPTAHYDRATE 2 G: 500 INJECTION, SOLUTION INTRAMUSCULAR; INTRAVENOUS at 07:12

## 2023-02-14 RX ADMIN — SODIUM CHLORIDE, POTASSIUM CHLORIDE, SODIUM LACTATE AND CALCIUM CHLORIDE 9 ML/HR: 600; 310; 30; 20 INJECTION, SOLUTION INTRAVENOUS at 06:28

## 2023-02-14 RX ADMIN — ASPIRIN 81 MG: 81 TABLET, CHEWABLE ORAL at 12:19

## 2023-02-14 RX ADMIN — TEMAZEPAM 15 MG: 15 CAPSULE ORAL at 20:33

## 2023-02-14 RX ADMIN — CEFAZOLIN SODIUM 2 G: 2 INJECTION, SOLUTION INTRAVENOUS at 14:27

## 2023-02-14 RX ADMIN — LIDOCAINE HYDROCHLORIDE 100 MG: 20 INJECTION, SOLUTION INFILTRATION; PERINEURAL at 07:16

## 2023-02-14 RX ADMIN — Medication 200 MCG: at 08:08

## 2023-02-14 RX ADMIN — Medication 200 MCG: at 07:25

## 2023-02-14 NOTE — ANESTHESIA PREPROCEDURE EVALUATION
Anesthesia Evaluation     Patient summary reviewed and Nursing notes reviewed   no history of anesthetic complications:  NPO Solid Status: > 6 hours  NPO Liquid Status: > 6 hours           Airway   Mallampati: II  TM distance: >3 FB  Neck ROM: full  no difficulty expected and No difficulty expected  Dental - normal exam     Pulmonary - negative pulmonary ROS and normal exam    breath sounds clear to auscultation  (-) rhonchi, decreased breath sounds, wheezes, rales, stridor  Cardiovascular - negative cardio ROS and normal exam    NYHA Classification: I  Rhythm: regular  Rate: normal    (-) murmur, weak pulses, friction rub, systolic click, carotid bruits, JVD, peripheral edema      Neuro/Psych  (+) dizziness/light headedness,    GI/Hepatic/Renal/Endo    (+)   thyroid problem hypothyroidism    Musculoskeletal     Abdominal  - normal exam    Abdomen: soft.   Substance History - negative use     OB/GYN negative ob/gyn ROS         Other   arthritis,                      Anesthesia Plan    ASA 2     general     intravenous induction     Anesthetic plan, risks, benefits, and alternatives have been provided, discussed and informed consent has been obtained with: patient.        CODE STATUS:

## 2023-02-14 NOTE — ANESTHESIA POSTPROCEDURE EVALUATION
"Patient: Basia Marquez    Procedure Summary     Date: 02/14/23 Room / Location: [facility] OR  / [facility] MAIN OR    Anesthesia Start: 0700 Anesthesia Stop: 0833    Procedure: TOTAL HIP ARTHROPLASTY ANTERIOR (Right: Hip) Diagnosis:       Closed right hip fracture, initial encounter ([facility])      (Closed right hip fracture, initial encounter ([facility]) [S72.001A])    Surgeons: Popeye Byers II, MD Provider: Abhinav Carpenter MD    Anesthesia Type: general ASA Status: 2          Anesthesia Type: general    Vitals  Vitals Value Taken Time   /66 02/14/23 0931   Temp 36.6 °C (97.9 °F) 02/14/23 0830   Pulse 67 02/14/23 0937   Resp 16 02/14/23 0930   SpO2 100 % 02/14/23 0937   Vitals shown include unvalidated device data.        Post Anesthesia Care and Evaluation    Patient location during evaluation: bedside  Patient participation: complete - patient participated  Level of consciousness: awake and alert  Pain management: adequate    Airway patency: patent  Anesthetic complications: No anesthetic complications    Cardiovascular status: acceptable  Respiratory status: acceptable  Hydration status: acceptable    Comments: /57 (BP Location: Left arm, Patient Position: Lying)   Pulse 71   Temp 36.6 °C (97.9 °F) (Oral)   Resp 16   Ht 175.3 cm (69\")   Wt 59.9 kg (132 lb)   SpO2 96%   BMI 19.49 kg/m²           "

## 2023-02-14 NOTE — ANESTHESIA PROCEDURE NOTES
Airway  Urgency: elective    Date/Time: 2/14/2023 7:19 AM  Airway not difficult    General Information and Staff    Patient location during procedure: OR  Anesthesiologist: Abhinav Carpenter MD  CRNA/CAA: Claire Bautista CRNA    Indications and Patient Condition  Indications for airway management: airway protection    Preoxygenated: yes  Mask difficulty assessment: 1 - vent by mask    Final Airway Details  Final airway type: endotracheal airway      Successful airway: ETT  Cuffed: yes   Successful intubation technique: direct laryngoscopy  Facilitating devices/methods: intubating stylet and cricoid pressure  Endotracheal tube insertion site: oral  Blade: Lion  Blade size: 2  ETT size (mm): 6.5  Cormack-Lehane Classification: grade I - full view of glottis  Placement verified by: chest auscultation and capnometry   Cuff volume (mL): 3  Measured from: lips  ETT/EBT  to lips (cm): 21  Number of attempts at approach: 1  Assessment: lips, teeth, and gum same as pre-op    Additional Comments  EBBS: ETCO2+: Atraumatic intubation

## 2023-02-15 LAB
ANION GAP SERPL CALCULATED.3IONS-SCNC: 4 MMOL/L (ref 5–15)
BASOPHILS # BLD AUTO: 0.01 10*3/MM3 (ref 0–0.2)
BASOPHILS NFR BLD AUTO: 0.2 % (ref 0–1.5)
BUN SERPL-MCNC: 17 MG/DL (ref 8–23)
BUN/CREAT SERPL: 24.3 (ref 7–25)
CALCIUM SPEC-SCNC: 8.7 MG/DL (ref 8.6–10.5)
CHLORIDE SERPL-SCNC: 109 MMOL/L (ref 98–107)
CO2 SERPL-SCNC: 25 MMOL/L (ref 22–29)
CREAT SERPL-MCNC: 0.7 MG/DL (ref 0.57–1)
DEPRECATED RDW RBC AUTO: 57.2 FL (ref 37–54)
EGFRCR SERPLBLD CKD-EPI 2021: 90.3 ML/MIN/1.73
EOSINOPHIL # BLD AUTO: 0.1 10*3/MM3 (ref 0–0.4)
EOSINOPHIL NFR BLD AUTO: 1.9 % (ref 0.3–6.2)
ERYTHROCYTE [DISTWIDTH] IN BLOOD BY AUTOMATED COUNT: 14.8 % (ref 12.3–15.4)
GLUCOSE SERPL-MCNC: 125 MG/DL (ref 65–99)
HCT VFR BLD AUTO: 25.3 % (ref 34–46.6)
HGB BLD-MCNC: 8.1 G/DL (ref 12–15.9)
IMM GRANULOCYTES # BLD AUTO: 0.03 10*3/MM3 (ref 0–0.05)
IMM GRANULOCYTES NFR BLD AUTO: 0.6 % (ref 0–0.5)
LYMPHOCYTES # BLD AUTO: 1.43 10*3/MM3 (ref 0.7–3.1)
LYMPHOCYTES NFR BLD AUTO: 27.7 % (ref 19.6–45.3)
MCH RBC QN AUTO: 33.3 PG (ref 26.6–33)
MCHC RBC AUTO-ENTMCNC: 32 G/DL (ref 31.5–35.7)
MCV RBC AUTO: 104.1 FL (ref 79–97)
MONOCYTES # BLD AUTO: 0.37 10*3/MM3 (ref 0.1–0.9)
MONOCYTES NFR BLD AUTO: 7.2 % (ref 5–12)
NEUTROPHILS NFR BLD AUTO: 3.22 10*3/MM3 (ref 1.7–7)
NEUTROPHILS NFR BLD AUTO: 62.4 % (ref 42.7–76)
NRBC BLD AUTO-RTO: 0 /100 WBC (ref 0–0.2)
PLATELET # BLD AUTO: 158 10*3/MM3 (ref 140–450)
PMV BLD AUTO: 9.2 FL (ref 6–12)
POTASSIUM SERPL-SCNC: 4.2 MMOL/L (ref 3.5–5.2)
RBC # BLD AUTO: 2.43 10*6/MM3 (ref 3.77–5.28)
SODIUM SERPL-SCNC: 138 MMOL/L (ref 136–145)
WBC NRBC COR # BLD: 5.16 10*3/MM3 (ref 3.4–10.8)
WHOLE BLOOD HOLD SPECIMEN: NORMAL

## 2023-02-15 PROCEDURE — 97530 THERAPEUTIC ACTIVITIES: CPT

## 2023-02-15 PROCEDURE — 85025 COMPLETE CBC W/AUTO DIFF WBC: CPT | Performed by: INTERNAL MEDICINE

## 2023-02-15 PROCEDURE — 97162 PT EVAL MOD COMPLEX 30 MIN: CPT

## 2023-02-15 PROCEDURE — 25010000002 CEFAZOLIN IN DEXTROSE 2-4 GM/100ML-% SOLUTION: Performed by: ORTHOPAEDIC SURGERY

## 2023-02-15 PROCEDURE — 80048 BASIC METABOLIC PNL TOTAL CA: CPT | Performed by: INTERNAL MEDICINE

## 2023-02-15 RX ORDER — DOCUSATE SODIUM 50 MG/5ML
150 LIQUID ORAL DAILY
Status: DISCONTINUED | OUTPATIENT
Start: 2023-02-15 | End: 2023-02-15

## 2023-02-15 RX ORDER — DOCUSATE SODIUM 50 MG/5 ML
150 LIQUID (ML) ORAL DAILY
Status: DISCONTINUED | OUTPATIENT
Start: 2023-02-15 | End: 2023-02-22 | Stop reason: HOSPADM

## 2023-02-15 RX ADMIN — NAPROXEN 500 MG: 500 TABLET ORAL at 08:01

## 2023-02-15 RX ADMIN — LEVOTHYROXINE SODIUM 137 MCG: 0.14 TABLET ORAL at 06:29

## 2023-02-15 RX ADMIN — Medication 1 TABLET: at 08:02

## 2023-02-15 RX ADMIN — ASPIRIN 81 MG: 81 TABLET, CHEWABLE ORAL at 20:30

## 2023-02-15 RX ADMIN — NAPROXEN 500 MG: 500 TABLET ORAL at 18:11

## 2023-02-15 RX ADMIN — Medication 5 DROP: at 08:02

## 2023-02-15 RX ADMIN — ASPIRIN 81 MG: 81 TABLET, CHEWABLE ORAL at 08:01

## 2023-02-15 RX ADMIN — OXYCODONE HYDROCHLORIDE AND ACETAMINOPHEN 1 TABLET: 7.5; 325 TABLET ORAL at 18:11

## 2023-02-15 RX ADMIN — OXYBUTYNIN CHLORIDE 10 MG: 10 TABLET, EXTENDED RELEASE ORAL at 08:01

## 2023-02-15 RX ADMIN — CALCIUM CARBONATE-VITAMIN D TAB 500 MG-200 UNIT 2 TABLET: 500-200 TAB at 20:29

## 2023-02-15 RX ADMIN — CEFAZOLIN SODIUM 2 G: 2 INJECTION, SOLUTION INTRAVENOUS at 06:29

## 2023-02-15 RX ADMIN — QUETIAPINE FUMARATE 200 MG: 200 TABLET ORAL at 08:02

## 2023-02-15 RX ADMIN — ESCITALOPRAM 10 MG: 10 TABLET, FILM COATED ORAL at 08:01

## 2023-02-15 RX ADMIN — DOCUSATE SODIUM 150 MG: 50 LIQUID ORAL at 14:43

## 2023-02-15 RX ADMIN — QUETIAPINE FUMARATE 200 MG: 200 TABLET ORAL at 20:30

## 2023-02-15 RX ADMIN — OXYCODONE HYDROCHLORIDE AND ACETAMINOPHEN 1 TABLET: 7.5; 325 TABLET ORAL at 10:00

## 2023-02-15 RX ADMIN — CALCIUM CARBONATE-VITAMIN D TAB 500 MG-200 UNIT 2 TABLET: 500-200 TAB at 08:02

## 2023-02-15 RX ADMIN — TEMAZEPAM 15 MG: 15 CAPSULE ORAL at 20:30

## 2023-02-16 LAB
BASOPHILS # BLD AUTO: 0.01 10*3/MM3 (ref 0–0.2)
BASOPHILS NFR BLD AUTO: 0.2 % (ref 0–1.5)
DEPRECATED RDW RBC AUTO: 53.3 FL (ref 37–54)
EOSINOPHIL # BLD AUTO: 0.2 10*3/MM3 (ref 0–0.4)
EOSINOPHIL NFR BLD AUTO: 4.7 % (ref 0.3–6.2)
ERYTHROCYTE [DISTWIDTH] IN BLOOD BY AUTOMATED COUNT: 14.4 % (ref 12.3–15.4)
FOLATE SERPL-MCNC: 19.7 NG/ML (ref 4.78–24.2)
HCT VFR BLD AUTO: 24.1 % (ref 34–46.6)
HGB BLD-MCNC: 8 G/DL (ref 12–15.9)
IMM GRANULOCYTES # BLD AUTO: 0.02 10*3/MM3 (ref 0–0.05)
IMM GRANULOCYTES NFR BLD AUTO: 0.5 % (ref 0–0.5)
IRON 24H UR-MRATE: 14 MCG/DL (ref 37–145)
IRON SATN MFR SERPL: 6 % (ref 20–50)
LYMPHOCYTES # BLD AUTO: 1.36 10*3/MM3 (ref 0.7–3.1)
LYMPHOCYTES NFR BLD AUTO: 31.6 % (ref 19.6–45.3)
MCH RBC QN AUTO: 34 PG (ref 26.6–33)
MCHC RBC AUTO-ENTMCNC: 33.2 G/DL (ref 31.5–35.7)
MCV RBC AUTO: 102.6 FL (ref 79–97)
MONOCYTES # BLD AUTO: 0.39 10*3/MM3 (ref 0.1–0.9)
MONOCYTES NFR BLD AUTO: 9.1 % (ref 5–12)
NEUTROPHILS NFR BLD AUTO: 2.32 10*3/MM3 (ref 1.7–7)
NEUTROPHILS NFR BLD AUTO: 53.9 % (ref 42.7–76)
NRBC BLD AUTO-RTO: 0 /100 WBC (ref 0–0.2)
PLATELET # BLD AUTO: 143 10*3/MM3 (ref 140–450)
PMV BLD AUTO: 8.8 FL (ref 6–12)
RBC # BLD AUTO: 2.35 10*6/MM3 (ref 3.77–5.28)
TIBC SERPL-MCNC: 250 MCG/DL (ref 298–536)
TRANSFERRIN SERPL-MCNC: 168 MG/DL (ref 200–360)
VIT B12 BLD-MCNC: 747 PG/ML (ref 211–946)
WBC NRBC COR # BLD: 4.3 10*3/MM3 (ref 3.4–10.8)

## 2023-02-16 PROCEDURE — 99221 1ST HOSP IP/OBS SF/LOW 40: CPT

## 2023-02-16 PROCEDURE — 83540 ASSAY OF IRON: CPT | Performed by: INTERNAL MEDICINE

## 2023-02-16 PROCEDURE — 82607 VITAMIN B-12: CPT | Performed by: INTERNAL MEDICINE

## 2023-02-16 PROCEDURE — 85025 COMPLETE CBC W/AUTO DIFF WBC: CPT | Performed by: INTERNAL MEDICINE

## 2023-02-16 PROCEDURE — 84466 ASSAY OF TRANSFERRIN: CPT | Performed by: INTERNAL MEDICINE

## 2023-02-16 PROCEDURE — 97530 THERAPEUTIC ACTIVITIES: CPT

## 2023-02-16 PROCEDURE — 82746 ASSAY OF FOLIC ACID SERUM: CPT | Performed by: INTERNAL MEDICINE

## 2023-02-16 RX ADMIN — QUETIAPINE FUMARATE 200 MG: 200 TABLET ORAL at 20:40

## 2023-02-16 RX ADMIN — ESCITALOPRAM 10 MG: 10 TABLET, FILM COATED ORAL at 09:29

## 2023-02-16 RX ADMIN — OXYCODONE HYDROCHLORIDE AND ACETAMINOPHEN 1 TABLET: 7.5; 325 TABLET ORAL at 12:26

## 2023-02-16 RX ADMIN — DOCUSATE SODIUM 150 MG: 50 LIQUID ORAL at 09:29

## 2023-02-16 RX ADMIN — ASPIRIN 81 MG: 81 TABLET, CHEWABLE ORAL at 20:40

## 2023-02-16 RX ADMIN — TEMAZEPAM 15 MG: 15 CAPSULE ORAL at 20:40

## 2023-02-16 RX ADMIN — QUETIAPINE FUMARATE 200 MG: 200 TABLET ORAL at 09:29

## 2023-02-16 RX ADMIN — ASPIRIN 81 MG: 81 TABLET, CHEWABLE ORAL at 09:29

## 2023-02-16 RX ADMIN — LEVOTHYROXINE SODIUM 137 MCG: 0.14 TABLET ORAL at 05:23

## 2023-02-16 RX ADMIN — OXYBUTYNIN CHLORIDE 10 MG: 10 TABLET, EXTENDED RELEASE ORAL at 09:29

## 2023-02-16 RX ADMIN — Medication 1 TABLET: at 09:29

## 2023-02-16 RX ADMIN — CALCIUM CARBONATE-VITAMIN D TAB 500 MG-200 UNIT 2 TABLET: 500-200 TAB at 09:29

## 2023-02-16 RX ADMIN — Medication 5 DROP: at 09:30

## 2023-02-16 RX ADMIN — CALCIUM CARBONATE-VITAMIN D TAB 500 MG-200 UNIT 2 TABLET: 500-200 TAB at 20:40

## 2023-02-16 RX ADMIN — NAPROXEN 500 MG: 500 TABLET ORAL at 09:29

## 2023-02-17 LAB
ANION GAP SERPL CALCULATED.3IONS-SCNC: 6.4 MMOL/L (ref 5–15)
BASOPHILS # BLD AUTO: 0.02 10*3/MM3 (ref 0–0.2)
BASOPHILS NFR BLD AUTO: 0.4 % (ref 0–1.5)
BUN SERPL-MCNC: 16 MG/DL (ref 8–23)
BUN/CREAT SERPL: 26.7 (ref 7–25)
CALCIUM SPEC-SCNC: 10.3 MG/DL (ref 8.6–10.5)
CHLORIDE SERPL-SCNC: 103 MMOL/L (ref 98–107)
CO2 SERPL-SCNC: 29.6 MMOL/L (ref 22–29)
CREAT SERPL-MCNC: 0.6 MG/DL (ref 0.57–1)
DEPRECATED RDW RBC AUTO: 53 FL (ref 37–54)
EGFRCR SERPLBLD CKD-EPI 2021: 93.7 ML/MIN/1.73
EOSINOPHIL # BLD AUTO: 0.17 10*3/MM3 (ref 0–0.4)
EOSINOPHIL NFR BLD AUTO: 3.6 % (ref 0.3–6.2)
ERYTHROCYTE [DISTWIDTH] IN BLOOD BY AUTOMATED COUNT: 14.4 % (ref 12.3–15.4)
GLUCOSE SERPL-MCNC: 99 MG/DL (ref 65–99)
HCT VFR BLD AUTO: 24.4 % (ref 34–46.6)
HGB BLD-MCNC: 7.9 G/DL (ref 12–15.9)
IMM GRANULOCYTES # BLD AUTO: 0.04 10*3/MM3 (ref 0–0.05)
IMM GRANULOCYTES NFR BLD AUTO: 0.9 % (ref 0–0.5)
LYMPHOCYTES # BLD AUTO: 1.55 10*3/MM3 (ref 0.7–3.1)
LYMPHOCYTES NFR BLD AUTO: 33.1 % (ref 19.6–45.3)
MCH RBC QN AUTO: 32.8 PG (ref 26.6–33)
MCHC RBC AUTO-ENTMCNC: 32.4 G/DL (ref 31.5–35.7)
MCV RBC AUTO: 101.2 FL (ref 79–97)
MONOCYTES # BLD AUTO: 0.44 10*3/MM3 (ref 0.1–0.9)
MONOCYTES NFR BLD AUTO: 9.4 % (ref 5–12)
NEUTROPHILS NFR BLD AUTO: 2.46 10*3/MM3 (ref 1.7–7)
NEUTROPHILS NFR BLD AUTO: 52.6 % (ref 42.7–76)
NRBC BLD AUTO-RTO: 0 /100 WBC (ref 0–0.2)
PLATELET # BLD AUTO: 182 10*3/MM3 (ref 140–450)
PMV BLD AUTO: 9.1 FL (ref 6–12)
POTASSIUM SERPL-SCNC: 4 MMOL/L (ref 3.5–5.2)
RBC # BLD AUTO: 2.41 10*6/MM3 (ref 3.77–5.28)
SODIUM SERPL-SCNC: 139 MMOL/L (ref 136–145)
WBC NRBC COR # BLD: 4.68 10*3/MM3 (ref 3.4–10.8)

## 2023-02-17 PROCEDURE — 97116 GAIT TRAINING THERAPY: CPT

## 2023-02-17 PROCEDURE — 80048 BASIC METABOLIC PNL TOTAL CA: CPT | Performed by: INTERNAL MEDICINE

## 2023-02-17 PROCEDURE — 85025 COMPLETE CBC W/AUTO DIFF WBC: CPT | Performed by: INTERNAL MEDICINE

## 2023-02-17 PROCEDURE — 99232 SBSQ HOSP IP/OBS MODERATE 35: CPT | Performed by: NURSE PRACTITIONER

## 2023-02-17 RX ORDER — POLYETHYLENE GLYCOL 3350 17 G/17G
17 POWDER, FOR SOLUTION ORAL 2 TIMES DAILY
Status: DISCONTINUED | OUTPATIENT
Start: 2023-02-17 | End: 2023-02-22 | Stop reason: HOSPADM

## 2023-02-17 RX ORDER — BISACODYL 10 MG
10 SUPPOSITORY, RECTAL RECTAL ONCE
Status: DISCONTINUED | OUTPATIENT
Start: 2023-02-17 | End: 2023-02-18

## 2023-02-17 RX ADMIN — POLYETHYLENE GLYCOL 3350 17 G: 17 POWDER, FOR SOLUTION ORAL at 12:26

## 2023-02-17 RX ADMIN — DOCUSATE SODIUM 150 MG: 50 LIQUID ORAL at 08:07

## 2023-02-17 RX ADMIN — CALCIUM CARBONATE-VITAMIN D TAB 500 MG-200 UNIT 2 TABLET: 500-200 TAB at 21:32

## 2023-02-17 RX ADMIN — OXYCODONE HYDROCHLORIDE AND ACETAMINOPHEN 1 TABLET: 7.5; 325 TABLET ORAL at 12:26

## 2023-02-17 RX ADMIN — QUETIAPINE FUMARATE 200 MG: 200 TABLET ORAL at 08:06

## 2023-02-17 RX ADMIN — QUETIAPINE FUMARATE 200 MG: 200 TABLET ORAL at 21:32

## 2023-02-17 RX ADMIN — CALCIUM CARBONATE-VITAMIN D TAB 500 MG-200 UNIT 2 TABLET: 500-200 TAB at 08:07

## 2023-02-17 RX ADMIN — OXYBUTYNIN CHLORIDE 10 MG: 10 TABLET, EXTENDED RELEASE ORAL at 08:06

## 2023-02-17 RX ADMIN — ASPIRIN 81 MG: 81 TABLET, CHEWABLE ORAL at 08:07

## 2023-02-17 RX ADMIN — TEMAZEPAM 15 MG: 15 CAPSULE ORAL at 23:08

## 2023-02-17 RX ADMIN — OXYCODONE HYDROCHLORIDE AND ACETAMINOPHEN 1 TABLET: 7.5; 325 TABLET ORAL at 21:32

## 2023-02-17 RX ADMIN — LEVOTHYROXINE SODIUM 137 MCG: 0.14 TABLET ORAL at 05:59

## 2023-02-17 RX ADMIN — Medication 5 DROP: at 08:07

## 2023-02-17 RX ADMIN — ASPIRIN 81 MG: 81 TABLET, CHEWABLE ORAL at 21:37

## 2023-02-17 RX ADMIN — POLYETHYLENE GLYCOL 3350 17 G: 17 POWDER, FOR SOLUTION ORAL at 21:32

## 2023-02-17 RX ADMIN — Medication 1 TABLET: at 08:07

## 2023-02-17 RX ADMIN — ESCITALOPRAM 10 MG: 10 TABLET, FILM COATED ORAL at 08:07

## 2023-02-18 ENCOUNTER — INPATIENT HOSPITAL (OUTPATIENT)
Dept: URBAN - METROPOLITAN AREA HOSPITAL 113 | Facility: HOSPITAL | Age: 78
End: 2023-02-18

## 2023-02-18 DIAGNOSIS — D50.9 IRON DEFICIENCY ANEMIA, UNSPECIFIED: ICD-10-CM

## 2023-02-18 DIAGNOSIS — K59.00 CONSTIPATION, UNSPECIFIED: ICD-10-CM

## 2023-02-18 DIAGNOSIS — M62.82 RHABDOMYOLYSIS: ICD-10-CM

## 2023-02-18 LAB
BASOPHILS # BLD AUTO: 0.02 10*3/MM3 (ref 0–0.2)
BASOPHILS NFR BLD AUTO: 0.4 % (ref 0–1.5)
DEPRECATED RDW RBC AUTO: 52.2 FL (ref 37–54)
EOSINOPHIL # BLD AUTO: 0.15 10*3/MM3 (ref 0–0.4)
EOSINOPHIL NFR BLD AUTO: 3.1 % (ref 0.3–6.2)
ERYTHROCYTE [DISTWIDTH] IN BLOOD BY AUTOMATED COUNT: 14.3 % (ref 12.3–15.4)
HCT VFR BLD AUTO: 24.2 % (ref 34–46.6)
HGB BLD-MCNC: 8.2 G/DL (ref 12–15.9)
IMM GRANULOCYTES # BLD AUTO: 0.04 10*3/MM3 (ref 0–0.05)
IMM GRANULOCYTES NFR BLD AUTO: 0.8 % (ref 0–0.5)
LYMPHOCYTES # BLD AUTO: 1.98 10*3/MM3 (ref 0.7–3.1)
LYMPHOCYTES NFR BLD AUTO: 41.2 % (ref 19.6–45.3)
MCH RBC QN AUTO: 33.7 PG (ref 26.6–33)
MCHC RBC AUTO-ENTMCNC: 33.9 G/DL (ref 31.5–35.7)
MCV RBC AUTO: 99.6 FL (ref 79–97)
MONOCYTES # BLD AUTO: 0.46 10*3/MM3 (ref 0.1–0.9)
MONOCYTES NFR BLD AUTO: 9.6 % (ref 5–12)
NEUTROPHILS NFR BLD AUTO: 2.16 10*3/MM3 (ref 1.7–7)
NEUTROPHILS NFR BLD AUTO: 44.9 % (ref 42.7–76)
NRBC BLD AUTO-RTO: 0 /100 WBC (ref 0–0.2)
PLATELET # BLD AUTO: 221 10*3/MM3 (ref 140–450)
PMV BLD AUTO: 9 FL (ref 6–12)
RBC # BLD AUTO: 2.43 10*6/MM3 (ref 3.77–5.28)
WBC NRBC COR # BLD: 4.81 10*3/MM3 (ref 3.4–10.8)

## 2023-02-18 PROCEDURE — 97110 THERAPEUTIC EXERCISES: CPT

## 2023-02-18 PROCEDURE — 99232 SBSQ HOSP IP/OBS MODERATE 35: CPT | Performed by: NURSE PRACTITIONER

## 2023-02-18 PROCEDURE — 85025 COMPLETE CBC W/AUTO DIFF WBC: CPT | Performed by: INTERNAL MEDICINE

## 2023-02-18 RX ORDER — BISACODYL 5 MG/1
10 TABLET, DELAYED RELEASE ORAL ONCE
Status: COMPLETED | OUTPATIENT
Start: 2023-02-18 | End: 2023-02-18

## 2023-02-18 RX ADMIN — DOCUSATE SODIUM 150 MG: 50 LIQUID ORAL at 08:52

## 2023-02-18 RX ADMIN — ASPIRIN 81 MG: 81 TABLET, CHEWABLE ORAL at 08:52

## 2023-02-18 RX ADMIN — TEMAZEPAM 15 MG: 15 CAPSULE ORAL at 21:39

## 2023-02-18 RX ADMIN — POLYETHYLENE GLYCOL 3350 17 G: 17 POWDER, FOR SOLUTION ORAL at 08:52

## 2023-02-18 RX ADMIN — QUETIAPINE FUMARATE 200 MG: 200 TABLET ORAL at 08:52

## 2023-02-18 RX ADMIN — OXYBUTYNIN CHLORIDE 10 MG: 10 TABLET, EXTENDED RELEASE ORAL at 08:52

## 2023-02-18 RX ADMIN — OXYCODONE HYDROCHLORIDE AND ACETAMINOPHEN 1 TABLET: 7.5; 325 TABLET ORAL at 16:57

## 2023-02-18 RX ADMIN — OXYCODONE HYDROCHLORIDE AND ACETAMINOPHEN 1 TABLET: 7.5; 325 TABLET ORAL at 05:33

## 2023-02-18 RX ADMIN — LEVOTHYROXINE SODIUM 137 MCG: 0.14 TABLET ORAL at 05:33

## 2023-02-18 RX ADMIN — Medication 1 TABLET: at 08:52

## 2023-02-18 RX ADMIN — CALCIUM CARBONATE-VITAMIN D TAB 500 MG-200 UNIT 2 TABLET: 500-200 TAB at 08:52

## 2023-02-18 RX ADMIN — ASPIRIN 81 MG: 81 TABLET, CHEWABLE ORAL at 21:39

## 2023-02-18 RX ADMIN — QUETIAPINE FUMARATE 200 MG: 200 TABLET ORAL at 20:17

## 2023-02-18 RX ADMIN — BISACODYL 10 MG: 5 TABLET ORAL at 17:01

## 2023-02-18 RX ADMIN — Medication 5 DROP: at 08:52

## 2023-02-18 RX ADMIN — Medication 5 DROP: at 20:16

## 2023-02-18 RX ADMIN — CALCIUM CARBONATE-VITAMIN D TAB 500 MG-200 UNIT 2 TABLET: 500-200 TAB at 20:17

## 2023-02-18 RX ADMIN — ESCITALOPRAM 10 MG: 10 TABLET, FILM COATED ORAL at 08:52

## 2023-02-19 ENCOUNTER — INPATIENT HOSPITAL (OUTPATIENT)
Dept: URBAN - METROPOLITAN AREA HOSPITAL 113 | Facility: HOSPITAL | Age: 78
End: 2023-02-19

## 2023-02-19 DIAGNOSIS — K59.00 CONSTIPATION, UNSPECIFIED: ICD-10-CM

## 2023-02-19 DIAGNOSIS — M62.82 RHABDOMYOLYSIS: ICD-10-CM

## 2023-02-19 DIAGNOSIS — D50.9 IRON DEFICIENCY ANEMIA, UNSPECIFIED: ICD-10-CM

## 2023-02-19 PROCEDURE — 99232 SBSQ HOSP IP/OBS MODERATE 35: CPT | Performed by: NURSE PRACTITIONER

## 2023-02-19 RX ORDER — BISACODYL 5 MG/1
15 TABLET, DELAYED RELEASE ORAL ONCE
Status: COMPLETED | OUTPATIENT
Start: 2023-02-19 | End: 2023-02-19

## 2023-02-19 RX ORDER — TEMAZEPAM 15 MG/1
15 CAPSULE ORAL NIGHTLY PRN
Status: DISCONTINUED | OUTPATIENT
Start: 2023-02-19 | End: 2023-02-22 | Stop reason: HOSPADM

## 2023-02-19 RX ADMIN — POLYETHYLENE GLYCOL 3350 17 G: 17 POWDER, FOR SOLUTION ORAL at 20:08

## 2023-02-19 RX ADMIN — QUETIAPINE FUMARATE 200 MG: 200 TABLET ORAL at 08:57

## 2023-02-19 RX ADMIN — ASPIRIN 81 MG: 81 TABLET, CHEWABLE ORAL at 08:58

## 2023-02-19 RX ADMIN — OXYCODONE HYDROCHLORIDE AND ACETAMINOPHEN 1 TABLET: 7.5; 325 TABLET ORAL at 20:08

## 2023-02-19 RX ADMIN — OXYCODONE HYDROCHLORIDE AND ACETAMINOPHEN 1 TABLET: 7.5; 325 TABLET ORAL at 11:35

## 2023-02-19 RX ADMIN — POLYETHYLENE GLYCOL 3350 17 G: 17 POWDER, FOR SOLUTION ORAL at 09:02

## 2023-02-19 RX ADMIN — DOCUSATE SODIUM 150 MG: 50 LIQUID ORAL at 08:57

## 2023-02-19 RX ADMIN — ESCITALOPRAM 10 MG: 10 TABLET, FILM COATED ORAL at 08:58

## 2023-02-19 RX ADMIN — BISACODYL 15 MG: 5 TABLET ORAL at 16:21

## 2023-02-19 RX ADMIN — CALCIUM CARBONATE-VITAMIN D TAB 500 MG-200 UNIT 2 TABLET: 500-200 TAB at 20:08

## 2023-02-19 RX ADMIN — OXYBUTYNIN CHLORIDE 10 MG: 10 TABLET, EXTENDED RELEASE ORAL at 08:57

## 2023-02-19 RX ADMIN — TEMAZEPAM 15 MG: 15 CAPSULE ORAL at 21:18

## 2023-02-19 RX ADMIN — Medication 5 DROP: at 08:58

## 2023-02-19 RX ADMIN — LEVOTHYROXINE SODIUM 137 MCG: 0.14 TABLET ORAL at 08:58

## 2023-02-19 RX ADMIN — Medication 1 TABLET: at 08:57

## 2023-02-19 RX ADMIN — OXYCODONE HYDROCHLORIDE AND ACETAMINOPHEN 1 TABLET: 7.5; 325 TABLET ORAL at 01:22

## 2023-02-19 RX ADMIN — ASPIRIN 81 MG: 81 TABLET, CHEWABLE ORAL at 20:08

## 2023-02-19 RX ADMIN — Medication 5 DROP: at 20:09

## 2023-02-19 RX ADMIN — QUETIAPINE FUMARATE 200 MG: 200 TABLET ORAL at 20:08

## 2023-02-19 RX ADMIN — CALCIUM CARBONATE-VITAMIN D TAB 500 MG-200 UNIT 2 TABLET: 500-200 TAB at 08:58

## 2023-02-20 PROCEDURE — 97110 THERAPEUTIC EXERCISES: CPT

## 2023-02-20 PROCEDURE — 97116 GAIT TRAINING THERAPY: CPT

## 2023-02-20 PROCEDURE — 99232 SBSQ HOSP IP/OBS MODERATE 35: CPT | Performed by: PHYSICIAN ASSISTANT

## 2023-02-20 PROCEDURE — 25010000002 NA FERRIC GLUC CPLX PER 12.5 MG: Performed by: PHYSICIAN ASSISTANT

## 2023-02-20 RX ADMIN — Medication 5 DROP: at 08:34

## 2023-02-20 RX ADMIN — CALCIUM CARBONATE-VITAMIN D TAB 500 MG-200 UNIT 2 TABLET: 500-200 TAB at 20:28

## 2023-02-20 RX ADMIN — QUETIAPINE FUMARATE 200 MG: 200 TABLET ORAL at 08:34

## 2023-02-20 RX ADMIN — ASPIRIN 81 MG: 81 TABLET, CHEWABLE ORAL at 08:34

## 2023-02-20 RX ADMIN — SODIUM CHLORIDE 125 MG: 9 INJECTION, SOLUTION INTRAVENOUS at 12:20

## 2023-02-20 RX ADMIN — ESCITALOPRAM 10 MG: 10 TABLET, FILM COATED ORAL at 08:34

## 2023-02-20 RX ADMIN — Medication 1 TABLET: at 08:34

## 2023-02-20 RX ADMIN — POLYETHYLENE GLYCOL 3350 17 G: 17 POWDER, FOR SOLUTION ORAL at 08:34

## 2023-02-20 RX ADMIN — Medication 5 DROP: at 20:28

## 2023-02-20 RX ADMIN — OXYCODONE HYDROCHLORIDE AND ACETAMINOPHEN 1 TABLET: 7.5; 325 TABLET ORAL at 20:28

## 2023-02-20 RX ADMIN — OXYBUTYNIN CHLORIDE 10 MG: 10 TABLET, EXTENDED RELEASE ORAL at 08:34

## 2023-02-20 RX ADMIN — LEVOTHYROXINE SODIUM 137 MCG: 0.14 TABLET ORAL at 06:20

## 2023-02-20 RX ADMIN — CALCIUM CARBONATE-VITAMIN D TAB 500 MG-200 UNIT 2 TABLET: 500-200 TAB at 08:34

## 2023-02-20 RX ADMIN — DOCUSATE SODIUM 150 MG: 50 LIQUID ORAL at 08:34

## 2023-02-20 RX ADMIN — QUETIAPINE FUMARATE 200 MG: 200 TABLET ORAL at 20:28

## 2023-02-20 RX ADMIN — TEMAZEPAM 15 MG: 15 CAPSULE ORAL at 20:28

## 2023-02-20 RX ADMIN — ASPIRIN 81 MG: 81 TABLET, CHEWABLE ORAL at 20:28

## 2023-02-21 LAB
ANION GAP SERPL CALCULATED.3IONS-SCNC: 7.9 MMOL/L (ref 5–15)
BASOPHILS # BLD AUTO: 0.03 10*3/MM3 (ref 0–0.2)
BASOPHILS NFR BLD AUTO: 0.6 % (ref 0–1.5)
BUN SERPL-MCNC: 24 MG/DL (ref 8–23)
BUN/CREAT SERPL: 20.5 (ref 7–25)
CALCIUM SPEC-SCNC: 10.3 MG/DL (ref 8.6–10.5)
CHLORIDE SERPL-SCNC: 100 MMOL/L (ref 98–107)
CO2 SERPL-SCNC: 29.1 MMOL/L (ref 22–29)
CREAT SERPL-MCNC: 1.17 MG/DL (ref 0.57–1)
DEPRECATED RDW RBC AUTO: 54.6 FL (ref 37–54)
EGFRCR SERPLBLD CKD-EPI 2021: 48.8 ML/MIN/1.73
EOSINOPHIL # BLD AUTO: 0.18 10*3/MM3 (ref 0–0.4)
EOSINOPHIL NFR BLD AUTO: 3.7 % (ref 0.3–6.2)
ERYTHROCYTE [DISTWIDTH] IN BLOOD BY AUTOMATED COUNT: 14.7 % (ref 12.3–15.4)
GLUCOSE SERPL-MCNC: 96 MG/DL (ref 65–99)
HCT VFR BLD AUTO: 24.4 % (ref 34–46.6)
HGB BLD-MCNC: 7.9 G/DL (ref 12–15.9)
IMM GRANULOCYTES # BLD AUTO: 0.07 10*3/MM3 (ref 0–0.05)
IMM GRANULOCYTES NFR BLD AUTO: 1.4 % (ref 0–0.5)
LYMPHOCYTES # BLD AUTO: 1.84 10*3/MM3 (ref 0.7–3.1)
LYMPHOCYTES NFR BLD AUTO: 37.3 % (ref 19.6–45.3)
MCH RBC QN AUTO: 32.8 PG (ref 26.6–33)
MCHC RBC AUTO-ENTMCNC: 32.4 G/DL (ref 31.5–35.7)
MCV RBC AUTO: 101.2 FL (ref 79–97)
MONOCYTES # BLD AUTO: 0.65 10*3/MM3 (ref 0.1–0.9)
MONOCYTES NFR BLD AUTO: 13.2 % (ref 5–12)
NEUTROPHILS NFR BLD AUTO: 2.16 10*3/MM3 (ref 1.7–7)
NEUTROPHILS NFR BLD AUTO: 43.8 % (ref 42.7–76)
NRBC BLD AUTO-RTO: 0 /100 WBC (ref 0–0.2)
PLATELET # BLD AUTO: 369 10*3/MM3 (ref 140–450)
PMV BLD AUTO: 9 FL (ref 6–12)
POTASSIUM SERPL-SCNC: 3.8 MMOL/L (ref 3.5–5.2)
RBC # BLD AUTO: 2.41 10*6/MM3 (ref 3.77–5.28)
SODIUM SERPL-SCNC: 137 MMOL/L (ref 136–145)
WBC NRBC COR # BLD: 4.93 10*3/MM3 (ref 3.4–10.8)

## 2023-02-21 PROCEDURE — 85025 COMPLETE CBC W/AUTO DIFF WBC: CPT | Performed by: INTERNAL MEDICINE

## 2023-02-21 PROCEDURE — 80048 BASIC METABOLIC PNL TOTAL CA: CPT | Performed by: INTERNAL MEDICINE

## 2023-02-21 RX ORDER — ASPIRIN 81 MG/1
81 TABLET, CHEWABLE ORAL 2 TIMES DAILY
Qty: 60 TABLET | Refills: 1 | Status: SHIPPED | OUTPATIENT
Start: 2023-02-21 | End: 2023-04-22

## 2023-02-21 RX ORDER — POLYETHYLENE GLYCOL 3350 17 G/17G
17 POWDER, FOR SOLUTION ORAL 2 TIMES DAILY
Qty: 60 PACKET | Refills: 0 | Status: SHIPPED | OUTPATIENT
Start: 2023-02-21 | End: 2023-03-23

## 2023-02-21 RX ADMIN — QUETIAPINE FUMARATE 200 MG: 200 TABLET ORAL at 20:35

## 2023-02-21 RX ADMIN — Medication 5 DROP: at 20:36

## 2023-02-21 RX ADMIN — QUETIAPINE FUMARATE 200 MG: 200 TABLET ORAL at 08:00

## 2023-02-21 RX ADMIN — CALCIUM CARBONATE-VITAMIN D TAB 500 MG-200 UNIT 2 TABLET: 500-200 TAB at 20:35

## 2023-02-21 RX ADMIN — LEVOTHYROXINE SODIUM 137 MCG: 0.14 TABLET ORAL at 08:00

## 2023-02-21 RX ADMIN — POLYETHYLENE GLYCOL 3350 17 G: 17 POWDER, FOR SOLUTION ORAL at 08:00

## 2023-02-21 RX ADMIN — DOCUSATE SODIUM 150 MG: 50 LIQUID ORAL at 08:00

## 2023-02-21 RX ADMIN — CALCIUM CARBONATE-VITAMIN D TAB 500 MG-200 UNIT 2 TABLET: 500-200 TAB at 08:00

## 2023-02-21 RX ADMIN — ASPIRIN 81 MG: 81 TABLET, CHEWABLE ORAL at 08:00

## 2023-02-21 RX ADMIN — OXYCODONE HYDROCHLORIDE AND ACETAMINOPHEN 1 TABLET: 7.5; 325 TABLET ORAL at 04:25

## 2023-02-21 RX ADMIN — ASPIRIN 81 MG: 81 TABLET, CHEWABLE ORAL at 20:35

## 2023-02-21 RX ADMIN — OXYBUTYNIN CHLORIDE 10 MG: 10 TABLET, EXTENDED RELEASE ORAL at 08:00

## 2023-02-21 RX ADMIN — OXYCODONE HYDROCHLORIDE AND ACETAMINOPHEN 1 TABLET: 7.5; 325 TABLET ORAL at 20:35

## 2023-02-21 RX ADMIN — Medication 5 DROP: at 08:00

## 2023-02-21 RX ADMIN — TEMAZEPAM 15 MG: 15 CAPSULE ORAL at 20:35

## 2023-02-21 RX ADMIN — Medication 1 TABLET: at 08:00

## 2023-02-21 RX ADMIN — ESCITALOPRAM 10 MG: 10 TABLET, FILM COATED ORAL at 08:00

## 2023-02-22 ENCOUNTER — TRANSCRIBE ORDERS (OUTPATIENT)
Dept: HOME HEALTH SERVICES | Facility: HOME HEALTHCARE | Age: 78
End: 2023-02-22
Payer: MEDICARE

## 2023-02-22 ENCOUNTER — HOME HEALTH ADMISSION (OUTPATIENT)
Dept: HOME HEALTH SERVICES | Facility: HOME HEALTHCARE | Age: 78
End: 2023-02-22
Payer: COMMERCIAL

## 2023-02-22 ENCOUNTER — READMISSION MANAGEMENT (OUTPATIENT)
Dept: CALL CENTER | Facility: HOSPITAL | Age: 78
End: 2023-02-22
Payer: MEDICARE

## 2023-02-22 VITALS
RESPIRATION RATE: 16 BRPM | BODY MASS INDEX: 19.55 KG/M2 | DIASTOLIC BLOOD PRESSURE: 67 MMHG | TEMPERATURE: 97.9 F | SYSTOLIC BLOOD PRESSURE: 123 MMHG | HEART RATE: 78 BPM | WEIGHT: 132 LBS | OXYGEN SATURATION: 95 % | HEIGHT: 69 IN

## 2023-02-22 DIAGNOSIS — Z96.641 STATUS POST RIGHT HIP REPLACEMENT: Primary | ICD-10-CM

## 2023-02-22 RX ADMIN — ASPIRIN 81 MG: 81 TABLET, CHEWABLE ORAL at 08:22

## 2023-02-22 RX ADMIN — POLYETHYLENE GLYCOL 3350 17 G: 17 POWDER, FOR SOLUTION ORAL at 08:22

## 2023-02-22 RX ADMIN — CALCIUM CARBONATE-VITAMIN D TAB 500 MG-200 UNIT 2 TABLET: 500-200 TAB at 08:22

## 2023-02-22 RX ADMIN — ESCITALOPRAM 10 MG: 10 TABLET, FILM COATED ORAL at 08:22

## 2023-02-22 RX ADMIN — QUETIAPINE FUMARATE 200 MG: 200 TABLET ORAL at 08:22

## 2023-02-22 RX ADMIN — DOCUSATE SODIUM 150 MG: 50 LIQUID ORAL at 08:22

## 2023-02-22 RX ADMIN — Medication 5 DROP: at 08:23

## 2023-02-22 RX ADMIN — LEVOTHYROXINE SODIUM 137 MCG: 0.14 TABLET ORAL at 06:24

## 2023-02-22 RX ADMIN — OXYBUTYNIN CHLORIDE 10 MG: 10 TABLET, EXTENDED RELEASE ORAL at 08:22

## 2023-02-22 RX ADMIN — Medication 1 TABLET: at 08:22

## 2023-02-23 ENCOUNTER — HOME CARE VISIT (OUTPATIENT)
Dept: HOME HEALTH SERVICES | Facility: HOME HEALTHCARE | Age: 78
End: 2023-02-23
Payer: COMMERCIAL

## 2023-02-23 VITALS
RESPIRATION RATE: 18 BRPM | WEIGHT: 132 LBS | OXYGEN SATURATION: 98 % | HEIGHT: 67 IN | TEMPERATURE: 97.5 F | DIASTOLIC BLOOD PRESSURE: 70 MMHG | SYSTOLIC BLOOD PRESSURE: 115 MMHG | BODY MASS INDEX: 20.72 KG/M2 | HEART RATE: 75 BPM

## 2023-02-23 PROCEDURE — G0151 HHCP-SERV OF PT,EA 15 MIN: HCPCS

## 2023-02-23 NOTE — HOME HEALTH
"Reason for referral: Patient was admitted to [facility] on 2-12-23 after falling at home and not being able to get help for approx. 24 hours. She did go to the ER and was dx with Fracture of right femoral neck. S/P surgical management with Total hip arthroplasty per Dr. Monster Byers. HX includes anemia, iron deficiency, hypothyroidism, constipation, chronic arthritis of multiple joints, anxiety and depression, s/p right TKR.     Patient lives alone in a patio home with no steps at front entrance, she has 2 walkers, one with fixed wheels and the other with swivel wheels.3 in1 commode placed over toilet in kahn bathroom. Patient has a son who lives close by and works at an office 5 mins from patients home, she also has a grandson who lives close by. Spoke with son regarding patient being anxious when therapist arrived and patient not sure where her discharge paperwork was located and was not sure about all of her medications. She reported not having any prescribed medication for pain. He stated that he would prefer she did not take narcotic medication if she could have pain controlled with tylenol. He stated he was there earlier this am and would be going back this afternoon. He will locate discharge paperwork and will have medications together and available for therapist to review at next scheduled visit tomorrow.     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     Subjective: \" I fell in the kitchen trying to plug in th coffee pot.\" \" I was supposed to go to [facility] for rehab but was sent home instead.\" \" I did not get any pain medication and not sure where my discharge papers are, I will have my son try to find them.\"     Objective: [facility] assessment started. Orders obtained for Skilled nursing and medications will be deferred to nursing as patient needs medication teaching as well as caregiver for improved " management of meds. Patient able to participate in therapeutic exercises and was instructed in THR protocol exercises, instructed in anterior hip precautions. Patient instructed not to cross her legs as she did several times throughout therapy session especially when seated. RIght leg over left. Pain and edema managment with instruction to use ice following exercise. Son to obtain tylenol for patient to take for pain managment as well. Instructed patient to sit in chair with a pillow to increase seat height as she had difficulty transferring from arm chair.    Focus of care is aftercare following anterior total hip replacement. Skilled physical therapy is needed for pain and edema mangement, instruction in therapeutic exercise, hip precautions, transfer and gait training for patient to improve her functional mobility as well as to improve safety and fall prevention. Will request orders for Skilled nursing for medication teaching, OT to address safety with ADLs. due to previous falls, and MSW for assit in obtaining life alert system and community resources available to improve patients safety as she lives alone in her home.     Plan: Continue 2w3, provide HEP next visit, address pain and edema managment, therapeutic exercise, transfers and ambulation with rolling walker.

## 2023-02-23 NOTE — OUTREACH NOTE
Prep Survey    Flowsheet Row Responses   [facility] facility patient discharged from? [facility]   Is LACE score < 7 ? No   Eligibility Readm Mgmt   Discharge diagnosis Fracture right femoral neck : S/P surgical management with Total hip arthroplasty    Does the patient have one of the following disease processes/diagnoses(primary or secondary)? Total Joint Replacement   Does the patient have Home health ordered? Yes   What is the Home health agency?  [facility]   Is there a DME ordered? Yes   What DME was ordered? Walker    Medication alerts for this patient ASA    Prep survey completed? Yes          Rafia RAMOS - Registered Nurse

## 2023-02-24 ENCOUNTER — HOME CARE VISIT (OUTPATIENT)
Dept: HOME HEALTH SERVICES | Facility: HOME HEALTHCARE | Age: 78
End: 2023-02-24
Payer: COMMERCIAL

## 2023-02-24 VITALS
OXYGEN SATURATION: 95 % | TEMPERATURE: 97.7 F | SYSTOLIC BLOOD PRESSURE: 120 MMHG | DIASTOLIC BLOOD PRESSURE: 68 MMHG | RESPIRATION RATE: 18 BRPM | HEART RATE: 81 BPM

## 2023-02-24 PROCEDURE — G0151 HHCP-SERV OF PT,EA 15 MIN: HCPCS

## 2023-02-24 PROCEDURE — G0299 HHS/HOSPICE OF RN EA 15 MIN: HCPCS

## 2023-02-26 VITALS
OXYGEN SATURATION: 95 % | RESPIRATION RATE: 20 BRPM | SYSTOLIC BLOOD PRESSURE: 118 MMHG | HEART RATE: 81 BPM | DIASTOLIC BLOOD PRESSURE: 72 MMHG | TEMPERATURE: 98.4 F

## 2023-02-27 NOTE — HOME HEALTH
"Subjective: \" I need to get something for pain.\" Patient informed that a nurse would be coming to see her this afternoon to go over all of her medications. Recommended patient get tylenol for pain.     Objective: Patient up answering the door with use of rolling walker. Patient did participate with protocol exercises and was given a written/pictured exercise sheet. Recommended patient continue use of rolling walker. She was willing for nursing to come this afternoon but did state that she likes her privacy. She does not feel tylenol will help with her pain. Instructed patient to adhere to hip precautions including not crossing legs and to use ice more often.     Plan: Will continue to see patient 2 visits next week for progression of HEP, transfers and gait training with rolling walker.  Orders were received for OT and MSW as well."

## 2023-02-27 NOTE — HOME HEALTH
"SN Initial Eval Note: Pt was seen for initial SN eval requested by PT. Pt anxious and agitated during visit. Pt reports 10/10 right groin pain. I inquired if she was utilizing Tylenol for pain management. Pt stated, \"That doesn't work for me and if you can't get me pain meds then I don't need you here.\" Attempted to go over medications with patient, offered to go where she kept her medications and pt stated, \"I don't want people knowing where my meds are, I will bring them to you.\" When given bag of pill bottles, this RN noticed there was no bottle for levothyroxine (Synthroid). When I inquired about this, pt handed me her sertraline bottle and stated, \"Here it is.\" I educated the patient that this was in fact sertraline and not Synthroid. Pt stated she has been taking her prescribed sertraline thinking it was Synthroid. This RN called pt's pharmacy to request a refill for Synthroid. Recommended to pt that her son pick this up for her as soon as it's ready. Pt verbalized understanding of the difference between sertraline and Synthroid. Pt stated she does not want nursing."

## 2023-02-28 ENCOUNTER — HOME CARE VISIT (OUTPATIENT)
Dept: HOME HEALTH SERVICES | Facility: HOME HEALTHCARE | Age: 78
End: 2023-02-28
Payer: COMMERCIAL

## 2023-02-28 VITALS
OXYGEN SATURATION: 97 % | TEMPERATURE: 97.5 F | DIASTOLIC BLOOD PRESSURE: 60 MMHG | HEART RATE: 98 BPM | SYSTOLIC BLOOD PRESSURE: 120 MMHG | RESPIRATION RATE: 18 BRPM

## 2023-02-28 PROCEDURE — G0151 HHCP-SERV OF PT,EA 15 MIN: HCPCS

## 2023-03-01 ENCOUNTER — HOME CARE VISIT (OUTPATIENT)
Dept: HOME HEALTH SERVICES | Facility: HOME HEALTHCARE | Age: 78
End: 2023-03-01
Payer: COMMERCIAL

## 2023-03-01 PROCEDURE — G0155 HHCP-SVS OF CSW,EA 15 MIN: HCPCS

## 2023-03-01 NOTE — HOME HEALTH
JOSE ROBERTO willis on this date with patient and MSW in training Bud AGARWAL Patient lives alone with regular assistance from sons. Patient would benefit from medical alert system or other device to provide emergency assistance. Patient denies issues with memory however unable to track conversation [facility] Communication with PT Sweetie. Will follow up with son, Patrick and staff regarding additional services needed.

## 2023-03-02 ENCOUNTER — READMISSION MANAGEMENT (OUTPATIENT)
Dept: CALL CENTER | Facility: HOSPITAL | Age: 78
End: 2023-03-02
Payer: MEDICARE

## 2023-03-02 NOTE — OUTREACH NOTE
Total Joint Week 1 Survey    Flowsheet Row Responses   [facility] patient discharged from? [place]   Does the patient have one of the following disease processes/diagnoses(primary or secondary)? Total Joint Replacement   Joint surgery performed? Hip   Week 1 attempt successful? Yes   Call start time 1522   Call end time 1523   Discharge diagnosis Fracture right femoral neck : S/P surgical management with Total hip arthroplasty    Person spoke with today (if not patient) and relationship son   Does the patient have all medications related to this admission filled (includes all antibiotics, pain medications, etc.) Yes   Is the patient taking all medications as directed (includes completed medication regime)? Yes   Does the patient have a follow up appointment with their surgeon? Yes   Has the patient kept scheduled appointments due by today? Yes   What is the Home health agency?  [facility]   Has home health visited the patient within 72 hours of discharge? Yes   What DME was ordered? Walker    Week 1 call completed? Yes   Is the patient interested in additional calls from an ambulatory ?  NOTE:  applies to high risk patients requiring additional follow-up. No   Wrap up additional comments Very brief call per son.  He states she is doing well at this time.           LOWELL GARCIA - Registered Nurse

## 2023-03-03 ENCOUNTER — HOME CARE VISIT (OUTPATIENT)
Dept: HOME HEALTH SERVICES | Facility: HOME HEALTHCARE | Age: 78
End: 2023-03-03
Payer: COMMERCIAL

## 2023-03-03 PROCEDURE — G0151 HHCP-SERV OF PT,EA 15 MIN: HCPCS

## 2023-03-03 NOTE — HOME HEALTH
"Subjective: \" Did I have an appointment today?\"     Objective: Patient took awhile to answer the door. She did admit she was sleeping when therapist arrived. Patient was confused regarding day of the week. She could not remember this therapists name even with multiple cues, confusing PT and . She was not sure if she had taken her medications. She did state she was eating and did perform exercises and ambulated with rolling walker. Patient MILEY dressing and zip closure removed. Patient continues to have swelling around incision. Patient instructed to use ice on hip several times a day to assist in reducing edema. Offered to get ice pack for patient and she declined stating she would get it later.     Assessment: Patient reminded that she has a follow up appt. on Thursday of next week on the 9th. All was written in calendar in the folder. Also wrote on front of home health folder therapist name and number as well as MD appointment. Patient reports she is only doing exercises when therapist is available.     Plan: Continue 2 visits next week one prior to MD appt. with anticipated discharge following."

## 2023-03-05 VITALS
DIASTOLIC BLOOD PRESSURE: 60 MMHG | TEMPERATURE: 97.3 F | HEART RATE: 86 BPM | RESPIRATION RATE: 18 BRPM | SYSTOLIC BLOOD PRESSURE: 100 MMHG | OXYGEN SATURATION: 98 %

## 2023-03-05 NOTE — CASE COMMUNICATION
OT attempted to reach patient by phone on evening of 3/2/2023 as well as on 3/3/2023 with no answer and no return call. OT left HIPPA compliant message with contact infor and request for return call.

## 2023-03-07 ENCOUNTER — HOME CARE VISIT (OUTPATIENT)
Dept: HOME HEALTH SERVICES | Facility: HOME HEALTHCARE | Age: 78
End: 2023-03-07
Payer: COMMERCIAL

## 2023-03-07 VITALS
DIASTOLIC BLOOD PRESSURE: 72 MMHG | RESPIRATION RATE: 18 BRPM | TEMPERATURE: 97.5 F | HEART RATE: 85 BPM | OXYGEN SATURATION: 99 % | SYSTOLIC BLOOD PRESSURE: 120 MMHG

## 2023-03-07 PROCEDURE — G0151 HHCP-SERV OF PT,EA 15 MIN: HCPCS

## 2023-03-07 PROCEDURE — G0152 HHCP-SERV OF OT,EA 15 MIN: HCPCS

## 2023-03-07 NOTE — CASE COMMUNICATION
OT on 03/07/2023 for evaluation for home safety, falls precautions, ADL safety, DME, education and vital signs. No further need for OT at this time. SN, MSW andPT still active. Thank you.

## 2023-03-07 NOTE — HOME HEALTH
Patient said she put a roast in the crock pot this morning and had laundry running while i was there.

## 2023-03-09 ENCOUNTER — TRANSCRIBE ORDERS (OUTPATIENT)
Dept: ADMINISTRATIVE | Facility: HOSPITAL | Age: 78
End: 2023-03-09
Payer: MEDICARE

## 2023-03-09 VITALS
TEMPERATURE: 97.5 F | DIASTOLIC BLOOD PRESSURE: 80 MMHG | HEART RATE: 80 BPM | OXYGEN SATURATION: 98 % | SYSTOLIC BLOOD PRESSURE: 120 MMHG | RESPIRATION RATE: 18 BRPM

## 2023-03-09 DIAGNOSIS — M25.561 RIGHT KNEE PAIN, UNSPECIFIED CHRONICITY: Primary | ICD-10-CM

## 2023-03-09 NOTE — HOME HEALTH
"Subjective: \" My artificial knee is bothering me today.\" \" My hip is not hurting.     Objective: Patient does have edema at incision which is more firm to the touch today. Patient advised to please arrange transportation to get to MD follow up appt. so this can be checked. Son will be out of town per Ashley CID who spoke with him earlier on the phone. Patient called son during home health visit and told him she would call her sister or friend to take her. Patient was tender to the touch.  Patient more alert and less confused this visit. She reports going to [place] with her son this weekend and states she used the walker to go. OT scheduled to come out this afternoon. Patient has not used ice as recommended. Patient reports no falls, States no medication changes.     Assessement: Patient able to advance to standing exercises, Is continuing to use rolling walker for ambulation. Patient instructed to place ice on right hip following therapy session. Offered to get ice for patient and she declined.     Plan: Will see one more visit at end of the week following follow up appointment with MD."

## 2023-03-10 ENCOUNTER — HOME CARE VISIT (OUTPATIENT)
Dept: HOME HEALTH SERVICES | Facility: HOME HEALTHCARE | Age: 78
End: 2023-03-10
Payer: COMMERCIAL

## 2023-03-10 PROCEDURE — G0151 HHCP-SERV OF PT,EA 15 MIN: HCPCS

## 2023-03-12 VITALS
OXYGEN SATURATION: 99 % | DIASTOLIC BLOOD PRESSURE: 70 MMHG | RESPIRATION RATE: 18 BRPM | TEMPERATURE: 97.5 F | HEART RATE: 79 BPM | SYSTOLIC BLOOD PRESSURE: 118 MMHG

## 2023-03-12 NOTE — HOME HEALTH
"Subjective : \" My sister in law took me to the doctor, I have orders to have lab work. They told me the swelling would eventually go away.\"     Objective: Patient answered door using rolling walker, she was able to call therapist by name and was dressed and ready for scheduled appointment.  She was oriented and less anxious this visit. Patient able to perform standing exercises at kitchen counter with use of HEP to follow. Patient instructed to continue exercises 1 to 2 times a day. Cane height adjusted appropriately. She stated she was not ready to use it at this time due to her onset of knee pain. In home noted patient does have orders to go to [facility] for blood work.     Assessment: Patient is able to ambulate independent in home with use of rolling walker. She is ambulating in kitchen with walker and use of counters for meal prep. Initial goals met. Recommended to patients son to assist with medications in medi planner and also for patient to have life alert system of some type.     Plan: Discharge to self with assist of caregivers for transportation and assist with meals."

## 2023-03-23 ENCOUNTER — READMISSION MANAGEMENT (OUTPATIENT)
Dept: CALL CENTER | Facility: HOSPITAL | Age: 78
End: 2023-03-23
Payer: MEDICARE

## 2023-03-23 NOTE — OUTREACH NOTE
Total Joint Month 1 Survey    Flowsheet Row Responses   [facility] facility patient discharged from? [facility]   Does the patient have one of the following disease processes/diagnoses(primary or secondary)? Total Joint Replacement   Joint surgery performed? Hip   Month 1 attempt successful? No   Unsuccessful attempts Attempt 1          LOWELL Pichardo Registered Nurse

## 2023-03-27 ENCOUNTER — READMISSION MANAGEMENT (OUTPATIENT)
Dept: CALL CENTER | Facility: HOSPITAL | Age: 78
End: 2023-03-27
Payer: MEDICARE

## 2023-03-27 NOTE — OUTREACH NOTE
Total Joint Month 1 Survey    Flowsheet Row Responses   [facility] facility patient discharged from? [place]   Does the patient have one of the following disease processes/diagnoses(primary or secondary)? Total Joint Replacement   Joint surgery performed? Hip   Month 1 attempt successful? No   Unsuccessful attempts Attempt 2          Destiny MOHAN - Licensed Nurse

## 2023-04-12 DIAGNOSIS — N39.41 URGE INCONTINENCE: ICD-10-CM

## 2023-04-12 RX ORDER — SOLIFENACIN SUCCINATE 10 MG/1
TABLET, FILM COATED ORAL
Qty: 90 TABLET | Refills: 2 | Status: SHIPPED | OUTPATIENT
Start: 2023-04-12 | End: 2023-07-05

## 2023-04-12 NOTE — TELEPHONE ENCOUNTER
Med refill. 78 yr old, Medicare. AE 5/20/22, Mx 8/1/22. [facility] pharmacy on file. Thank you.

## 2023-08-31 ENCOUNTER — PROCEDURE VISIT (OUTPATIENT)
Dept: OBSTETRICS AND GYNECOLOGY | Facility: CLINIC | Age: 78
End: 2023-08-31
Payer: MEDICARE

## 2023-08-31 DIAGNOSIS — Z12.31 VISIT FOR SCREENING MAMMOGRAM: Primary | ICD-10-CM

## 2023-09-12 ENCOUNTER — TRANSCRIBE ORDERS (OUTPATIENT)
Dept: ADMINISTRATIVE | Facility: HOSPITAL | Age: 78
End: 2023-09-12
Payer: MEDICARE

## 2023-09-12 ENCOUNTER — LAB (OUTPATIENT)
Dept: LAB | Facility: HOSPITAL | Age: 78
End: 2023-09-12
Payer: MEDICARE

## 2023-09-12 DIAGNOSIS — E03.9 HYPOTHYROIDISM, UNSPECIFIED TYPE: ICD-10-CM

## 2023-09-12 DIAGNOSIS — M19.90 ARTHRITIS: ICD-10-CM

## 2023-09-12 DIAGNOSIS — M81.0 OSTEOPOROSIS OF HAND: ICD-10-CM

## 2023-09-12 DIAGNOSIS — Z87.81 S/P RIGHT HIP FRACTURE: ICD-10-CM

## 2023-09-12 DIAGNOSIS — Z00.01 ENCOUNTER FOR GENERAL ADULT MEDICAL EXAMINATION WITH ABNORMAL FINDINGS: ICD-10-CM

## 2023-09-12 DIAGNOSIS — Z00.01 ENCOUNTER FOR GENERAL ADULT MEDICAL EXAMINATION WITH ABNORMAL FINDINGS: Primary | ICD-10-CM

## 2023-09-12 LAB
25(OH)D3 SERPL-MCNC: 55 NG/ML (ref 30–100)
ALBUMIN SERPL-MCNC: 4.3 G/DL (ref 3.5–5.2)
ALBUMIN/GLOB SERPL: 1.6 G/DL
ALP SERPL-CCNC: 83 U/L (ref 39–117)
ALT SERPL W P-5'-P-CCNC: 22 U/L (ref 1–33)
ANION GAP SERPL CALCULATED.3IONS-SCNC: 10.7 MMOL/L (ref 5–15)
AST SERPL-CCNC: 26 U/L (ref 1–32)
BACTERIA UR QL AUTO: ABNORMAL /HPF
BILIRUB SERPL-MCNC: 0.7 MG/DL (ref 0–1.2)
BILIRUB UR QL STRIP: NEGATIVE
BUN SERPL-MCNC: 13 MG/DL (ref 8–23)
BUN/CREAT SERPL: 19.1 (ref 7–25)
CALCIUM SPEC-SCNC: 9.9 MG/DL (ref 8.6–10.5)
CHLORIDE SERPL-SCNC: 98 MMOL/L (ref 98–107)
CLARITY UR: ABNORMAL
CO2 SERPL-SCNC: 27.3 MMOL/L (ref 22–29)
COD CRY URNS QL: ABNORMAL /HPF
COLOR UR: YELLOW
CREAT SERPL-MCNC: 0.68 MG/DL (ref 0.57–1)
CRP SERPL-MCNC: 0.4 MG/DL (ref 0–0.5)
DEPRECATED RDW RBC AUTO: 46.5 FL (ref 37–54)
EGFRCR SERPLBLD CKD-EPI 2021: 90.4 ML/MIN/1.73
ERYTHROCYTE [DISTWIDTH] IN BLOOD BY AUTOMATED COUNT: 13.3 % (ref 12.3–15.4)
ERYTHROCYTE [SEDIMENTATION RATE] IN BLOOD: 14 MM/HR (ref 0–30)
GLOBULIN UR ELPH-MCNC: 2.7 GM/DL
GLUCOSE SERPL-MCNC: 103 MG/DL (ref 65–99)
GLUCOSE UR STRIP-MCNC: NEGATIVE MG/DL
HCT VFR BLD AUTO: 43.4 % (ref 34–46.6)
HGB BLD-MCNC: 14.7 G/DL (ref 12–15.9)
HGB UR QL STRIP.AUTO: ABNORMAL
HYALINE CASTS UR QL AUTO: ABNORMAL /LPF
KETONES UR QL STRIP: ABNORMAL
LEUKOCYTE ESTERASE UR QL STRIP.AUTO: ABNORMAL
MCH RBC QN AUTO: 32.5 PG (ref 26.6–33)
MCHC RBC AUTO-ENTMCNC: 33.9 G/DL (ref 31.5–35.7)
MCV RBC AUTO: 95.8 FL (ref 79–97)
NITRITE UR QL STRIP: NEGATIVE
PH UR STRIP.AUTO: 6.5 [PH] (ref 5–8)
PLATELET # BLD AUTO: 234 10*3/MM3 (ref 140–450)
PMV BLD AUTO: 9.4 FL (ref 6–12)
POTASSIUM SERPL-SCNC: 3.5 MMOL/L (ref 3.5–5.2)
PROT SERPL-MCNC: 7 G/DL (ref 6–8.5)
PROT UR QL STRIP: ABNORMAL
RBC # BLD AUTO: 4.53 10*6/MM3 (ref 3.77–5.28)
RBC # UR STRIP: ABNORMAL /HPF
REF LAB TEST METHOD: ABNORMAL
SODIUM SERPL-SCNC: 136 MMOL/L (ref 136–145)
SP GR UR STRIP: 1.02 (ref 1–1.03)
SQUAMOUS #/AREA URNS HPF: ABNORMAL /HPF
T4 FREE SERPL-MCNC: 0.37 NG/DL (ref 0.93–1.7)
TSH SERPL DL<=0.05 MIU/L-ACNC: 78.4 UIU/ML (ref 0.27–4.2)
UROBILINOGEN UR QL STRIP: ABNORMAL
WBC # UR STRIP: ABNORMAL /HPF
WBC NRBC COR # BLD: 4.68 10*3/MM3 (ref 3.4–10.8)

## 2023-09-12 PROCEDURE — 80053 COMPREHEN METABOLIC PANEL: CPT

## 2023-09-12 PROCEDURE — 85652 RBC SED RATE AUTOMATED: CPT

## 2023-09-12 PROCEDURE — 84439 ASSAY OF FREE THYROXINE: CPT

## 2023-09-12 PROCEDURE — 82306 VITAMIN D 25 HYDROXY: CPT

## 2023-09-12 PROCEDURE — 36415 COLL VENOUS BLD VENIPUNCTURE: CPT

## 2023-09-12 PROCEDURE — 84443 ASSAY THYROID STIM HORMONE: CPT

## 2023-09-12 PROCEDURE — 81001 URINALYSIS AUTO W/SCOPE: CPT

## 2023-09-12 PROCEDURE — 85027 COMPLETE CBC AUTOMATED: CPT

## 2023-09-12 PROCEDURE — 86140 C-REACTIVE PROTEIN: CPT

## 2024-02-09 DIAGNOSIS — Z86.19 HISTORY OF HERPES GENITALIS: ICD-10-CM

## 2024-02-12 RX ORDER — ACYCLOVIR 400 MG/1
400 TABLET ORAL EVERY MORNING
Qty: 60 TABLET | Refills: 1 | Status: SHIPPED | OUTPATIENT
Start: 2024-02-12

## 2024-02-29 ENCOUNTER — LAB (OUTPATIENT)
Dept: LAB | Facility: HOSPITAL | Age: 79
End: 2024-02-29
Payer: MEDICARE

## 2024-02-29 ENCOUNTER — TRANSCRIBE ORDERS (OUTPATIENT)
Dept: ADMINISTRATIVE | Facility: HOSPITAL | Age: 79
End: 2024-02-29
Payer: MEDICARE

## 2024-02-29 DIAGNOSIS — E03.9 HYPOTHYROIDISM, UNSPECIFIED TYPE: ICD-10-CM

## 2024-02-29 DIAGNOSIS — E03.9 HYPOTHYROIDISM, UNSPECIFIED TYPE: Primary | ICD-10-CM

## 2024-02-29 LAB
T4 FREE SERPL-MCNC: 0.62 NG/DL (ref 0.93–1.7)
TSH SERPL DL<=0.05 MIU/L-ACNC: 71.1 UIU/ML (ref 0.27–4.2)

## 2024-02-29 PROCEDURE — 84439 ASSAY OF FREE THYROXINE: CPT

## 2024-02-29 PROCEDURE — 36415 COLL VENOUS BLD VENIPUNCTURE: CPT

## 2024-02-29 PROCEDURE — 84443 ASSAY THYROID STIM HORMONE: CPT

## (undated) DEVICE — HANDPIECE SET WITH COAXIAL HIGH FLOW TIP AND SUCTION TUBE: Brand: INTERPULSE

## (undated) DEVICE — RECIPROCATING BLADE HEAVY DUTY LONG, OFFSET  (77.6 X 0.77 X 11.2MM)

## (undated) DEVICE — PREP IM ENCHANCED TOTAL HIP BONE                                    PREPARATION KIT: Brand: PREP-IM

## (undated) DEVICE — TRAP FLD MINIVAC MEGADYNE 100ML

## (undated) DEVICE — 3M™ IOBAN™ 2 ANTIMICROBIAL INCISE DRAPE 6640EZ: Brand: IOBAN™ 2

## (undated) DEVICE — 450 ML BOTTLE OF 0.05% CHLORHEXIDINE GLUCONATE IN 99.95% STERILE WATER FOR IRRIGATION, USP AND APPLICATOR.: Brand: IRRISEPT ANTIMICROBIAL WOUND LAVAGE

## (undated) DEVICE — CEMENT MIXING SYSTEM WITH FEMORAL BREAKWAY NOZZLE: Brand: REVOLUTION

## (undated) DEVICE — NEEDLE, QUINCKE, 20GX3.5": Brand: MEDLINE

## (undated) DEVICE — COAXIAL FEMORAL CANAL TIP

## (undated) DEVICE — DRSNG BURN ACTICOAT FLEX 7 1X24IN

## (undated) DEVICE — ZIP 16 SURGICAL SKIN CLOSURE DEVICE, PSA: Brand: ZIP 16 SURGICAL SKIN CLOSURE DEVICE

## (undated) DEVICE — TBG PENCL TELESCP MEGADYNE SMOKE EVAC 10FT

## (undated) DEVICE — SOL NACL 0.9PCT 100ML SGL

## (undated) DEVICE — PICO 7 10CM X 30CM: Brand: PICO™ 7

## (undated) DEVICE — GLV SURG SIGNATURE ESSENTIAL PF LTX SZ8.5

## (undated) DEVICE — SUT ETHIB 2 CV V37 MS/4 30IN MX69G

## (undated) DEVICE — PK ANT HIP 40

## (undated) DEVICE — MAT FLR ABSORBENT LG 4FT 10 2.5FT

## (undated) DEVICE — S/M FLEXIBLE ALEXIS ORTHOPAEDIC PROTECTOR: Brand: ALEXIS® ORTHOPAEDIC PROTECTOR

## (undated) DEVICE — GLV SURG PREMIERPRO ORTHO LTX PF SZ8.5 BRN

## (undated) DEVICE — SOL ISO/ALC 70PCT 4OZ

## (undated) DEVICE — APPL CHLORAPREP HI/LITE 26ML ORNG